# Patient Record
Sex: FEMALE | Race: BLACK OR AFRICAN AMERICAN | NOT HISPANIC OR LATINO | Employment: OTHER | RURAL
[De-identification: names, ages, dates, MRNs, and addresses within clinical notes are randomized per-mention and may not be internally consistent; named-entity substitution may affect disease eponyms.]

---

## 2020-06-09 ENCOUNTER — HISTORICAL (OUTPATIENT)
Dept: ADMINISTRATIVE | Facility: HOSPITAL | Age: 75
End: 2020-06-09

## 2020-06-09 LAB — GLUCOSE SERPL-MCNC: 112 MG/DL (ref 70–105)

## 2020-06-10 LAB
LAB AP CLINICAL INFORMATION: NORMAL
LAB AP COMMENTS: NORMAL
LAB AP DIAGNOSIS - HISTORICAL: NORMAL
LAB AP GROSS PATHOLOGY - HISTORICAL: NORMAL
LAB AP SPECIMEN SUBMITTED - HISTORICAL: NORMAL

## 2020-10-05 ENCOUNTER — HISTORICAL (OUTPATIENT)
Dept: ADMINISTRATIVE | Facility: HOSPITAL | Age: 75
End: 2020-10-05

## 2020-10-15 ENCOUNTER — HISTORICAL (OUTPATIENT)
Dept: ADMINISTRATIVE | Facility: HOSPITAL | Age: 75
End: 2020-10-15

## 2020-10-15 LAB — GLUCOSE SERPL-MCNC: 136 MG/DL (ref 70–105)

## 2020-10-16 LAB

## 2021-09-24 ENCOUNTER — OFFICE VISIT (OUTPATIENT)
Dept: DERMATOLOGY | Facility: CLINIC | Age: 76
End: 2021-09-24
Payer: MEDICARE

## 2021-09-24 VITALS — RESPIRATION RATE: 18 BRPM | BODY MASS INDEX: 47.11 KG/M2 | WEIGHT: 256 LBS | HEIGHT: 62 IN

## 2021-09-24 DIAGNOSIS — L23.9 ALLERGIC CONTACT DERMATITIS, UNSPECIFIED TRIGGER: Primary | ICD-10-CM

## 2021-09-24 PROCEDURE — 99213 OFFICE O/P EST LOW 20 MIN: CPT | Mod: ,,, | Performed by: DERMATOLOGY

## 2021-09-24 PROCEDURE — 99213 PR OFFICE/OUTPT VISIT, EST, LEVL III, 20-29 MIN: ICD-10-PCS | Mod: ,,, | Performed by: DERMATOLOGY

## 2021-09-24 RX ORDER — METHYLPREDNISOLONE 4 MG/1
TABLET ORAL
COMMUNITY
Start: 2021-07-29 | End: 2023-02-28 | Stop reason: ALTCHOICE

## 2021-09-24 RX ORDER — LOSARTAN POTASSIUM 100 MG/1
TABLET ORAL
COMMUNITY
Start: 2021-09-23 | End: 2023-02-16 | Stop reason: SDUPTHER

## 2021-09-24 RX ORDER — AMOXICILLIN 500 MG/1
CAPSULE ORAL
COMMUNITY
Start: 2021-05-05 | End: 2023-02-16

## 2021-09-24 RX ORDER — TRIAMCINOLONE ACETONIDE 0.25 MG/G
OINTMENT TOPICAL
Qty: 80 G | Refills: 3 | Status: SHIPPED | OUTPATIENT
Start: 2021-09-24 | End: 2023-02-28 | Stop reason: ALTCHOICE

## 2021-09-24 RX ORDER — DOXYCYCLINE 100 MG/1
1 CAPSULE ORAL
COMMUNITY
Start: 2020-09-29 | End: 2023-02-28 | Stop reason: ALTCHOICE

## 2021-09-24 RX ORDER — LEVALBUTEROL TARTRATE 45 UG/1
AEROSOL, METERED ORAL
COMMUNITY
Start: 2021-09-23 | End: 2023-02-28 | Stop reason: ALTCHOICE

## 2021-09-24 RX ORDER — AZELASTINE 1 MG/ML
1 SPRAY, METERED NASAL 2 TIMES DAILY
COMMUNITY
Start: 2021-09-17

## 2021-09-24 RX ORDER — ATENOLOL 50 MG/1
TABLET ORAL
COMMUNITY
Start: 2021-09-23 | End: 2023-02-16 | Stop reason: SDUPTHER

## 2021-09-24 RX ORDER — MORPHINE SULFATE 15 MG/1
15 TABLET, FILM COATED, EXTENDED RELEASE ORAL NIGHTLY
COMMUNITY
Start: 2021-09-08

## 2021-09-24 RX ORDER — GLIMEPIRIDE 4 MG/1
TABLET ORAL
COMMUNITY
Start: 2021-07-24

## 2021-09-24 RX ORDER — NITROGLYCERIN 80 MG/1
PATCH TRANSDERMAL
COMMUNITY
Start: 2021-07-06 | End: 2022-02-14 | Stop reason: SDUPTHER

## 2021-09-24 RX ORDER — DEXCHLORPHENIRAMINE MALEATE AND PHENYLEPHRINE HYDROCHLORIDE 2; 10 MG/1; MG/1
1 TABLET, COATED ORAL 2 TIMES DAILY
COMMUNITY
Start: 2021-09-22

## 2021-09-24 RX ORDER — METFORMIN HYDROCHLORIDE 500 MG/1
TABLET ORAL
COMMUNITY
Start: 2021-09-21

## 2021-09-24 RX ORDER — TRIAMCINOLONE ACETONIDE 1 MG/G
OINTMENT TOPICAL
COMMUNITY
Start: 2021-04-01 | End: 2023-02-28 | Stop reason: ALTCHOICE

## 2021-09-24 RX ORDER — OXYCODONE AND ACETAMINOPHEN 10; 325 MG/1; MG/1
TABLET ORAL
COMMUNITY
Start: 2021-09-08 | End: 2023-02-28 | Stop reason: ALTCHOICE

## 2022-02-14 ENCOUNTER — OFFICE VISIT (OUTPATIENT)
Dept: CARDIOLOGY | Facility: CLINIC | Age: 77
End: 2022-02-14
Payer: MEDICARE

## 2022-02-14 VITALS
BODY MASS INDEX: 42.88 KG/M2 | SYSTOLIC BLOOD PRESSURE: 138 MMHG | HEART RATE: 79 BPM | HEIGHT: 62 IN | RESPIRATION RATE: 16 BRPM | DIASTOLIC BLOOD PRESSURE: 70 MMHG | WEIGHT: 233 LBS

## 2022-02-14 DIAGNOSIS — I10 HYPERTENSION, UNSPECIFIED TYPE: Primary | ICD-10-CM

## 2022-02-14 DIAGNOSIS — I10 HYPERTENSION, UNSPECIFIED TYPE: ICD-10-CM

## 2022-02-14 DIAGNOSIS — I10 PRIMARY HYPERTENSION: ICD-10-CM

## 2022-02-14 DIAGNOSIS — R07.9 CHEST PAIN, UNSPECIFIED TYPE: ICD-10-CM

## 2022-02-14 PROCEDURE — 93005 ELECTROCARDIOGRAM TRACING: CPT | Mod: PBBFAC | Performed by: STUDENT IN AN ORGANIZED HEALTH CARE EDUCATION/TRAINING PROGRAM

## 2022-02-14 PROCEDURE — 93010 ELECTROCARDIOGRAM REPORT: CPT | Mod: S$PBB,,, | Performed by: STUDENT IN AN ORGANIZED HEALTH CARE EDUCATION/TRAINING PROGRAM

## 2022-02-14 PROCEDURE — 93010 EKG 12-LEAD: ICD-10-PCS | Mod: S$PBB,,, | Performed by: STUDENT IN AN ORGANIZED HEALTH CARE EDUCATION/TRAINING PROGRAM

## 2022-02-14 PROCEDURE — 99214 OFFICE O/P EST MOD 30 MIN: CPT | Mod: PBBFAC | Performed by: STUDENT IN AN ORGANIZED HEALTH CARE EDUCATION/TRAINING PROGRAM

## 2022-02-14 PROCEDURE — 99214 OFFICE O/P EST MOD 30 MIN: CPT | Mod: S$PBB,,, | Performed by: STUDENT IN AN ORGANIZED HEALTH CARE EDUCATION/TRAINING PROGRAM

## 2022-02-14 PROCEDURE — 99214 PR OFFICE/OUTPT VISIT, EST, LEVL IV, 30-39 MIN: ICD-10-PCS | Mod: S$PBB,,, | Performed by: STUDENT IN AN ORGANIZED HEALTH CARE EDUCATION/TRAINING PROGRAM

## 2022-02-14 RX ORDER — NITROGLYCERIN 80 MG/1
PATCH TRANSDERMAL
Qty: 30 PATCH | Refills: 3 | Status: SHIPPED | OUTPATIENT
Start: 2022-02-14 | End: 2023-02-28 | Stop reason: ALTCHOICE

## 2022-02-14 NOTE — PROGRESS NOTES
"  PCP: Primary Doctor No    Referring Provider:     Subjective:   Quiana Bland is a 76 y.o. female with hx of HTN, DM, HLD who presents to establish cardiac care.     Patient of Dr. Reyes    Patient reports chronic aches and pain. Has chronic chest pain. Last C in 2019 with mild non-obstructive CAD. As per the patient she has nitro patches that help her and is adamant that she would like to continue them.     Fhx: non-contributary  Shx: Denies smoking, etoh or drug use    EKG 2/14/22 - NSR, non-sp T wave changes  ECHO 2019 - normal LV and RV size and fxn. Mild LAE enlargement. No significant valvular abnl.  LHC 2019 - mild non-obstructive CAD      No results found for: NA, K, CL, CO2, BUN, CREATININE, CALCIUM, ANIONGAP, ESTGFRAFRICA, EGFRNONAA    No results found for: CHOL  No results found for: HDL  No results found for: LDLCALC  No results found for: TRIG  No results found for: CHOLHDL    No results found for: WBC, HGB, HCT, MCV, PLT        Review of Systems   Respiratory: Negative for cough and shortness of breath.    Cardiovascular: Negative for chest pain, palpitations, orthopnea, claudication, leg swelling and PND.         Objective:   /70   Pulse 79   Resp 16   Ht 5' 2" (1.575 m)   Wt 105.7 kg (233 lb)   BMI 42.62 kg/m²     Physical Exam  Constitutional:       Appearance: She is obese.   Cardiovascular:      Rate and Rhythm: Normal rate and regular rhythm.      Pulses: Normal pulses.      Heart sounds: Normal heart sounds.   Pulmonary:      Effort: Pulmonary effort is normal.      Breath sounds: Normal breath sounds.   Neurological:      Mental Status: She is alert.           Assessment:     1. Hypertension, unspecified type  EKG 12-lead   2. Primary hypertension     3. Chest pain, unspecified type           Plan:   HTN (hypertension)  Chronic controlled  Continue Home BP meds    Chest pain  Mild non-obstructive CAD in 2019  Concern for microvascular dysfunction  CP relieved with nitro " patches

## 2022-02-14 NOTE — ASSESSMENT & PLAN NOTE
Mild non-obstructive CAD in 2019  Concern for microvascular dysfunction  CP relieved with nitro patches

## 2022-02-18 RX ORDER — DAPAGLIFLOZIN 10 MG/1
10 TABLET, FILM COATED ORAL DAILY
COMMUNITY
End: 2022-02-18 | Stop reason: SDUPTHER

## 2022-02-21 RX ORDER — DAPAGLIFLOZIN 10 MG/1
10 TABLET, FILM COATED ORAL DAILY
Qty: 90 TABLET | Refills: 1 | Status: SHIPPED | OUTPATIENT
Start: 2022-02-21 | End: 2023-02-16 | Stop reason: SDUPTHER

## 2022-11-15 DIAGNOSIS — G47.33 OSA (OBSTRUCTIVE SLEEP APNEA): Primary | ICD-10-CM

## 2022-11-20 ENCOUNTER — PROCEDURE VISIT (OUTPATIENT)
Dept: SLEEP MEDICINE | Facility: HOSPITAL | Age: 77
End: 2022-11-20
Payer: MEDICARE

## 2022-11-20 DIAGNOSIS — G47.33 OSA (OBSTRUCTIVE SLEEP APNEA): ICD-10-CM

## 2022-11-20 PROCEDURE — 95811 POLYSOM 6/>YRS CPAP 4/> PARM: CPT | Mod: PO

## 2023-02-16 ENCOUNTER — OFFICE VISIT (OUTPATIENT)
Dept: CARDIOLOGY | Facility: CLINIC | Age: 78
End: 2023-02-16
Payer: MEDICARE

## 2023-02-16 VITALS
WEIGHT: 225 LBS | RESPIRATION RATE: 16 BRPM | DIASTOLIC BLOOD PRESSURE: 80 MMHG | SYSTOLIC BLOOD PRESSURE: 150 MMHG | HEIGHT: 62 IN | HEART RATE: 86 BPM | BODY MASS INDEX: 41.41 KG/M2

## 2023-02-16 DIAGNOSIS — I10 HYPERTENSION, UNSPECIFIED TYPE: Primary | ICD-10-CM

## 2023-02-16 DIAGNOSIS — I50.33 ACUTE ON CHRONIC HEART FAILURE WITH PRESERVED EJECTION FRACTION: ICD-10-CM

## 2023-02-16 DIAGNOSIS — R06.02 SHORTNESS OF BREATH: Primary | ICD-10-CM

## 2023-02-16 DIAGNOSIS — I10 HYPERTENSION, UNSPECIFIED TYPE: ICD-10-CM

## 2023-02-16 PROCEDURE — 99214 OFFICE O/P EST MOD 30 MIN: CPT | Mod: S$PBB,,, | Performed by: STUDENT IN AN ORGANIZED HEALTH CARE EDUCATION/TRAINING PROGRAM

## 2023-02-16 PROCEDURE — 93005 ELECTROCARDIOGRAM TRACING: CPT | Mod: PBBFAC | Performed by: STUDENT IN AN ORGANIZED HEALTH CARE EDUCATION/TRAINING PROGRAM

## 2023-02-16 PROCEDURE — 3077F PR MOST RECENT SYSTOLIC BLOOD PRESSURE >= 140 MM HG: ICD-10-PCS | Mod: CPTII,,, | Performed by: STUDENT IN AN ORGANIZED HEALTH CARE EDUCATION/TRAINING PROGRAM

## 2023-02-16 PROCEDURE — 1159F PR MEDICATION LIST DOCUMENTED IN MEDICAL RECORD: ICD-10-PCS | Mod: CPTII,,, | Performed by: STUDENT IN AN ORGANIZED HEALTH CARE EDUCATION/TRAINING PROGRAM

## 2023-02-16 PROCEDURE — 3079F DIAST BP 80-89 MM HG: CPT | Mod: CPTII,,, | Performed by: STUDENT IN AN ORGANIZED HEALTH CARE EDUCATION/TRAINING PROGRAM

## 2023-02-16 PROCEDURE — 3079F PR MOST RECENT DIASTOLIC BLOOD PRESSURE 80-89 MM HG: ICD-10-PCS | Mod: CPTII,,, | Performed by: STUDENT IN AN ORGANIZED HEALTH CARE EDUCATION/TRAINING PROGRAM

## 2023-02-16 PROCEDURE — 3077F SYST BP >= 140 MM HG: CPT | Mod: CPTII,,, | Performed by: STUDENT IN AN ORGANIZED HEALTH CARE EDUCATION/TRAINING PROGRAM

## 2023-02-16 PROCEDURE — 99215 OFFICE O/P EST HI 40 MIN: CPT | Mod: PBBFAC | Performed by: STUDENT IN AN ORGANIZED HEALTH CARE EDUCATION/TRAINING PROGRAM

## 2023-02-16 PROCEDURE — 1159F MED LIST DOCD IN RCRD: CPT | Mod: CPTII,,, | Performed by: STUDENT IN AN ORGANIZED HEALTH CARE EDUCATION/TRAINING PROGRAM

## 2023-02-16 PROCEDURE — 93010 EKG 12-LEAD: ICD-10-PCS | Mod: S$PBB,,, | Performed by: STUDENT IN AN ORGANIZED HEALTH CARE EDUCATION/TRAINING PROGRAM

## 2023-02-16 PROCEDURE — 99214 PR OFFICE/OUTPT VISIT, EST, LEVL IV, 30-39 MIN: ICD-10-PCS | Mod: S$PBB,,, | Performed by: STUDENT IN AN ORGANIZED HEALTH CARE EDUCATION/TRAINING PROGRAM

## 2023-02-16 PROCEDURE — 93010 ELECTROCARDIOGRAM REPORT: CPT | Mod: S$PBB,,, | Performed by: STUDENT IN AN ORGANIZED HEALTH CARE EDUCATION/TRAINING PROGRAM

## 2023-02-16 RX ORDER — BUDESONIDE 0.5 MG/2ML
INHALANT ORAL
COMMUNITY
Start: 2022-05-09

## 2023-02-16 RX ORDER — LOSARTAN POTASSIUM 100 MG/1
TABLET ORAL
COMMUNITY
Start: 2022-08-03

## 2023-02-16 RX ORDER — DAPAGLIFLOZIN 10 MG/1
10 TABLET, FILM COATED ORAL DAILY
Qty: 90 TABLET | Refills: 3 | Status: SHIPPED | OUTPATIENT
Start: 2023-02-16

## 2023-02-16 RX ORDER — CYANOCOBALAMIN 1000 UG/ML
INJECTION, SOLUTION INTRAMUSCULAR; SUBCUTANEOUS
COMMUNITY
Start: 2022-04-13

## 2023-02-16 RX ORDER — SPIRONOLACTONE AND HYDROCHLOROTHIAZIDE 25; 25 MG/1; MG/1
1 TABLET ORAL 2 TIMES DAILY
COMMUNITY
Start: 2022-11-01 | End: 2023-02-28 | Stop reason: ALTCHOICE

## 2023-02-16 RX ORDER — ISOSORBIDE MONONITRATE 30 MG/1
30 TABLET, EXTENDED RELEASE ORAL DAILY
Qty: 90 TABLET | Refills: 3 | Status: SHIPPED | OUTPATIENT
Start: 2023-02-16 | End: 2024-02-16

## 2023-02-16 RX ORDER — IPRATROPIUM BROMIDE AND ALBUTEROL SULFATE 2.5; .5 MG/3ML; MG/3ML
SOLUTION RESPIRATORY (INHALATION)
COMMUNITY
Start: 2022-05-09

## 2023-02-16 RX ORDER — LINACLOTIDE 145 UG/1
CAPSULE, GELATIN COATED ORAL
COMMUNITY
Start: 2023-01-24

## 2023-02-16 RX ORDER — REPAGLINIDE 1 MG/1
TABLET ORAL
COMMUNITY
Start: 2023-02-11 | End: 2023-02-16 | Stop reason: SDUPTHER

## 2023-02-16 RX ORDER — REPAGLINIDE 1 MG/1
TABLET ORAL
COMMUNITY
Start: 2022-04-11

## 2023-02-16 RX ORDER — FLUTICASONE PROPIONATE 50 MCG
SPRAY, SUSPENSION (ML) NASAL
COMMUNITY
Start: 2022-01-11

## 2023-02-16 RX ORDER — ATENOLOL 50 MG/1
TABLET ORAL
COMMUNITY
Start: 2022-07-08 | End: 2023-02-16

## 2023-02-16 RX ORDER — ALBUTEROL SULFATE 2 MG/5ML
SYRUP ORAL
COMMUNITY
Start: 2023-02-13

## 2023-02-16 RX ORDER — OLOPATADINE HYDROCHLORIDE 1 MG/ML
SOLUTION/ DROPS OPHTHALMIC
COMMUNITY
Start: 2022-08-10

## 2023-02-16 RX ORDER — METOPROLOL SUCCINATE 100 MG/1
100 TABLET, EXTENDED RELEASE ORAL DAILY
Qty: 90 TABLET | Refills: 3 | Status: SHIPPED | OUTPATIENT
Start: 2023-02-16 | End: 2024-02-21

## 2023-02-16 NOTE — PATIENT INSTRUCTIONS
Stop atenolol  Start metoprolol xl 100mg once a day  Start IMDUR 30mg once a day  Vein clinic referral  ECHO

## 2023-02-16 NOTE — ASSESSMENT & PLAN NOTE
Symptoms congestive HF  Last EF was 55%  Will repeat ECHO  Check NTproBNP  Already on farxiga; if IVC is dilated on ECHo will start lasix   Will refer to vein clinic for possible venous insufficiency

## 2023-02-16 NOTE — PROGRESS NOTES
"  PCP: Primary Doctor No    Referring Provider:     Subjective:   Quiana Bland is a 77 y.o. female with hx of HTN, DM, HLD who presents to establish cardiac care.     2/16/23 - Poor historian. Report chronic leg swelling with knee pain. Has orthopnea and PND. Limited mobility. Wearing stockings today    2/2022 - Patient reports chronic aches and pain. Has chronic chest pain. Last [facility] [place] in 2019 with mild non-obstructive CAD. As per the patient she has nitro patches that help her and is adamant that she would like to continue them.     Fhx: non-contributary  Shx: Denies smoking, etoh or drug use    EKG 2/14/22 - NSR, non-sp T wave changes  ECHO 2019 - normal LV and RV size and fxn. Mild LAE enlargement. No significant valvular abnl.  [facility] [place] 2019 - mild non-obstructive CAD      No results found for: NA, K, CL, CO2, BUN, CREATININE, CALCIUM, ANIONGAP, ESTGFRAFRICA, EGFRNONAA    No results found for: CHOL  No results found for: HDL  No results found for: LDLCALC  No results found for: TRIG  No results found for: CHOLHDL    No results found for: WBC, HGB, HCT, MCV, PLT        Review of Systems   Respiratory:  Negative for cough and shortness of breath.    Cardiovascular:  Negative for chest pain, palpitations, orthopnea, claudication, leg swelling and PND.       Objective:   BP (!) 150/80   Pulse 86   Resp 16   Ht 5' 2" (1.575 m)   Wt 102.1 kg (225 lb)   BMI 41.15 kg/m²     Physical Exam  Constitutional:       Appearance: She is obese.   Cardiovascular:      Rate and Rhythm: Normal rate and regular rhythm.      Pulses: Normal pulses.      Heart sounds: Normal heart sounds.   Pulmonary:      Effort: Pulmonary effort is normal.      Breath sounds: Normal breath sounds.   Neurological:      Mental Status: She is alert.         Assessment:     1. Shortness of breath  Ambulatory referral/consult to [facility]    Echo    CBC Auto Differential    Basic Metabolic Panel    NT-Pro Natriuretic Peptide      2. Hypertension, " unspecified type  EKG 12-lead      3. Acute on chronic heart failure with preserved ejection fraction              Plan:   Acute on chronic heart failure with preserved ejection fraction  Symptoms congestive HF  Last EF was 55%  Will repeat ECHO  Check NTproBNP  Already on farxiga; if IVC is dilated on ECHo will start lasix   Will refer to vein clinic for possible venous insufficiency      HTN (hypertension)  Uncontrolled  Stop atenolol  Switch to metop xl 100mg qd and IMDUR 30mg qd

## 2023-02-28 ENCOUNTER — OFFICE VISIT (OUTPATIENT)
Dept: VASCULAR SURGERY | Facility: CLINIC | Age: 78
End: 2023-02-28
Payer: MEDICARE

## 2023-02-28 VITALS
WEIGHT: 228.63 LBS | BODY MASS INDEX: 42.07 KG/M2 | RESPIRATION RATE: 18 BRPM | HEART RATE: 76 BPM | SYSTOLIC BLOOD PRESSURE: 118 MMHG | HEIGHT: 62 IN | DIASTOLIC BLOOD PRESSURE: 70 MMHG

## 2023-02-28 DIAGNOSIS — L81.9 HYPERPIGMENTATION OF SKIN: ICD-10-CM

## 2023-02-28 DIAGNOSIS — R60.0 EDEMA, LOWER EXTREMITY: ICD-10-CM

## 2023-02-28 DIAGNOSIS — M79.605 LEG PAIN, BILATERAL: Primary | ICD-10-CM

## 2023-02-28 DIAGNOSIS — M79.604 LEG PAIN, BILATERAL: Primary | ICD-10-CM

## 2023-02-28 DIAGNOSIS — R06.02 SHORTNESS OF BREATH: ICD-10-CM

## 2023-02-28 PROCEDURE — 3074F SYST BP LT 130 MM HG: CPT | Mod: CPTII,,, | Performed by: FAMILY MEDICINE

## 2023-02-28 PROCEDURE — 3078F PR MOST RECENT DIASTOLIC BLOOD PRESSURE < 80 MM HG: ICD-10-PCS | Mod: CPTII,,, | Performed by: FAMILY MEDICINE

## 2023-02-28 PROCEDURE — 99203 OFFICE O/P NEW LOW 30 MIN: CPT | Mod: S$PBB,,, | Performed by: FAMILY MEDICINE

## 2023-02-28 PROCEDURE — 3078F DIAST BP <80 MM HG: CPT | Mod: CPTII,,, | Performed by: FAMILY MEDICINE

## 2023-02-28 PROCEDURE — 1159F MED LIST DOCD IN RCRD: CPT | Mod: CPTII,,, | Performed by: FAMILY MEDICINE

## 2023-02-28 PROCEDURE — 3074F PR MOST RECENT SYSTOLIC BLOOD PRESSURE < 130 MM HG: ICD-10-PCS | Mod: CPTII,,, | Performed by: FAMILY MEDICINE

## 2023-02-28 PROCEDURE — 99203 PR OFFICE/OUTPT VISIT, NEW, LEVL III, 30-44 MIN: ICD-10-PCS | Mod: S$PBB,,, | Performed by: FAMILY MEDICINE

## 2023-02-28 PROCEDURE — 1159F PR MEDICATION LIST DOCUMENTED IN MEDICAL RECORD: ICD-10-PCS | Mod: CPTII,,, | Performed by: FAMILY MEDICINE

## 2023-02-28 PROCEDURE — 1160F PR REVIEW ALL MEDS BY PRESCRIBER/CLIN PHARMACIST DOCUMENTED: ICD-10-PCS | Mod: CPTII,,, | Performed by: FAMILY MEDICINE

## 2023-02-28 PROCEDURE — 1160F RVW MEDS BY RX/DR IN RCRD: CPT | Mod: CPTII,,, | Performed by: FAMILY MEDICINE

## 2023-02-28 PROCEDURE — 99213 OFFICE O/P EST LOW 20 MIN: CPT | Mod: PBBFAC | Performed by: FAMILY MEDICINE

## 2023-02-28 RX ORDER — LEVALBUTEROL TARTRATE 45 UG/1
AEROSOL, METERED ORAL
COMMUNITY
Start: 2022-07-07

## 2023-02-28 RX ORDER — SPIRONOLACTONE AND HYDROCHLOROTHIAZIDE 25; 25 MG/1; MG/1
TABLET ORAL
COMMUNITY
Start: 2022-11-10

## 2023-02-28 NOTE — PROGRESS NOTES
VEIN CENTER CLINIC NOTE    Patient ID: Quiana Bland is a 77 y.o. female.    I. HISTORY     Chief Complaint:   Chief Complaint   Patient presents with    Leg Swelling     Exam room 2.  NP referral from Dr. Jones re: bilateral leg swelling.        HPI: Quiana Bland is a 77 y.o. female who is referred here today by Dr Ramírez, cardiology, for evaluation of bilateral lower extremity leg swelling and discomfort.  Symptoms are progressive/worsening and began a proximally 2 years ago.  Location is bilateral lower extremities below the knees. Symptoms are worse at the end of the day.  History of venous interventions includes none.  Unknown family history of venous disease.  Patient also sees Dr. Lozoya, pulmonology, and has a history of obstructive sleep apnea and uses a CPAP nightly.  She also sleeps in a chair secondary to orthopnea.  Also complains of right knee pain and swelling and limited ambulation throughout the day.  Recent negative BNP.  Echocardiogram pending.      Venous Disease Medical Necessity Documentation Initial Visit Date:  02/28/2023 Return Check Date:    Have you ever had a rupture or bleed from a varicose vein in your leg(s)?              [] Yes  [x] No   [] Yes   [] No   Have you ever been diagnosed with phlebitis, cellulitis, or inflammation in the area of the varicose veins of  your leg(s)?  [] Yes  [x] No    [] Yes   [] No   Do you have darkened or inflamed skin on your legs?   [] Yes   [x] No   [] Yes   [] No   Do you have leg swelling?     [x] Yes   [] No   [] Yes   [] No   Do you have leg pain?   [x] Yes   [] No   [] Yes   [] No   If yes, describe the type of pain?    [x]   Stabbing [x]  Radiating [x]  Aching   [x]  Tightness [x]  Throbbing               [x]  Burning [x]  Cramping              Do you have leg discomfort?   [x] Yes   [] No   [] Yes   [] No   If yes, describe the type of discomfort?    [x]  Heaviness [x]  Fullness   [x]  Restlessness [x] Tired/Fatigued [x] Itching               Have you ever worn compression hose?   [x] Yes   [] No   [] Yes   [] No   If yes, how long?    18 months       Do you elevate your legs while sitting?   [x] Yes   [] No   [] Yes   [] No   Does venous disease (varicose veins, ulcers, skin changes, leg pain/swelling) interfere with your daily life?  If yes, check activities you are limited or unable to do.    [x]  Shower  [x]   Walk  []  Exercise  [x] Play with children/grandchildren  [x]  Shop [] Work [x] Stand for any period of time [x] Sleep                               [x] Sitting for an extended period of time.           [x] Yes   [] No   [] Yes   [] No   Do you exercise/have you tried to exercise (i.e.  Walk our participate in a regular exercise routine)?  [] Yes  [x] No   [] Yes   [] No   BMI 41.8             Past Medical History:   Diagnosis Date    CHF (congestive heart failure)     DM (diabetes mellitus)     High cholesterol     HTN (hypertension)     Seizures         Past Surgical History:   Procedure Laterality Date    CARDIAC CATHETERIZATION       SECTION      x 1    gall stones removed      HYSTERECTOMY      TUBAL LIGATION      VAGINAL DELIVERY      x 5       Social History     Tobacco Use   Smoking Status Never   Smokeless Tobacco Never         Current Outpatient Medications:     albuterol 2 mg/5 mL syrup, take 1/2 (one-half) TEASPOONFUL (2.5mls) BY MOUTH THREE TIMES DAILY, Disp: , Rfl:     albuterol-ipratropium (DUO-NEB) 2.5 mg-0.5 mg/3 mL nebulizer solution, Ipratropium-Albuterol 0.5-2.5 (3) MG/3ML Inhalation Solution QTY: 120 mL Days: 30 Refills: 11  Written: 22 Patient Instructions: USE 1 VIAL IN NEBULIZER 4 TIMES DAILY, Disp: , Rfl:     azelastine (ASTELIN) 137 mcg (0.1 %) nasal spray, 1 spray 2 (two) times daily., Disp: , Rfl:     budesonide (PULMICORT) 0.5 mg/2 mL nebulizer solution, Budesonide 0.5 MG/2ML Inhalation Suspension QTY: 120 mL Days: 30 Refills: 11  Written: 22 Patient Instructions: USE 1 VIAL  IN   NEBULIZER TWICE  DAILY - rinse mouth out after each use, Disp: , Rfl:     cyanocobalamin 1,000 mcg/mL injection, Cyanocobalamin 1000 MCG/ML Injection Solution QTY: 1 mL Days: 0 Refills: 0  Written: 04/13/22 Patient Instructions: ONCE MONTHLY, Disp: , Rfl:     dapagliflozin (FARXIGA) 10 mg tablet, Take 1 tablet (10 mg total) by mouth once daily., Disp: 90 tablet, Rfl: 3    fluticasone propionate (FLONASE) 50 mcg/actuation nasal spray, Fluticasone Propionate 50 MCG/ACT Nasal Suspension QTY: 1 gram Days: 30 Refills: 3  Written: 01/11/22 Patient Instructions: twice a day 1 spray each nostril, Disp: , Rfl:     glimepiride (AMARYL) 4 MG tablet, , Disp: , Rfl:     isosorbide mononitrate (IMDUR) 30 MG 24 hr tablet, Take 1 tablet (30 mg total) by mouth once daily., Disp: 90 tablet, Rfl: 3    levalbuterol (XOPENEX HFA) 45 mcg/actuation inhaler, Xopenex HFA 45 MCG/ACT Inhalation Aerosol QTY: 0  Days: 0 Refills: 0  Written: 07/07/22 Patient Instructions:, Disp: , Rfl:     LINZESS 145 mcg Cap capsule, TAKE ONE CAPSULE BY MOUTH ONCE DAILY FOR constipation, Disp: , Rfl:     losartan (COZAAR) 100 MG tablet, Losartan Potassium 100 MG Oral Tablet QTY: 90 tablet Days: 90 Refills: 3  Written: 08/03/22 Patient Instructions: once a day, Disp: , Rfl:     metFORMIN (GLUCOPHAGE) 500 MG tablet, , Disp: , Rfl:     metoprolol succinate (TOPROL-XL) 100 MG 24 hr tablet, Take 1 tablet (100 mg total) by mouth once daily., Disp: 90 tablet, Rfl: 3    morphine (MS CONTIN) 15 MG 12 hr tablet, Take 15 mg by mouth nightly., Disp: , Rfl:     olopatadine (PATANOL) 0.1 % ophthalmic solution, Pataday 0.1% Ophthalmic Solution QTY: 0  Days: 0 Refills: 0  Written: 08/10/22 Patient Instructions:, Disp: , Rfl:     repaglinide (PRANDIN) 1 MG tablet, Repaglinide 1 MG Oral Tablet QTY: 60 tablet Days: 30 Refills: 5  Written: 04/11/22 Patient Instructions: twice a day with food, Disp: , Rfl:     RYMED, DEXCHLORPHENIRAMINE-PE, 2-10 mg Tab, Take 1 tablet by mouth 2  (two) times daily., Disp: , Rfl:     spironolactone-hydrochlorothiazide 25-25mg (ALDACTAZIDE) 25-25 mg Tab, Spironolactone-HCTZ 25-25 MG Oral Tablet QTY: 0 tablet Days: 7 Refills: 0  Written: 11/10/22 Patient Instructions:, Disp: , Rfl:     Review of Systems   Constitutional:  Negative for activity change, chills, diaphoresis, fatigue and fever.   Respiratory:  Negative for cough and shortness of breath.    Cardiovascular:  Positive for leg swelling. Negative for chest pain and claudication.        Hyperpigmentation LE   Gastrointestinal:  Negative for nausea and vomiting.   Musculoskeletal:  Positive for leg pain. Negative for joint swelling.   Integumentary:  Negative for rash and wound.   Neurological:  Negative for weakness and numbness.        II. PHYSICAL EXAM     Physical Exam  Constitutional:       General: She is awake. She is not in acute distress.     Appearance: Normal appearance. She is obese. She is not ill-appearing or toxic-appearing.   HENT:      Head: Normocephalic and atraumatic.   Eyes:      Extraocular Movements: Extraocular movements intact.      Conjunctiva/sclera: Conjunctivae normal.      Pupils: Pupils are equal, round, and reactive to light.   Neck:      Vascular: No carotid bruit or JVD.   Cardiovascular:      Rate and Rhythm: Normal rate and regular rhythm.      Pulses:           Dorsalis pedis pulses are detected w/ Doppler on the right side and detected w/ Doppler on the left side.        Posterior tibial pulses are detected w/ Doppler on the right side and detected w/ Doppler on the left side.      Heart sounds: No murmur heard.  Pulmonary:      Effort: Pulmonary effort is normal. No respiratory distress.      Breath sounds: No stridor. No wheezing, rhonchi or rales.   Musculoskeletal:         General: No swelling, tenderness or deformity.      Right lower le+ Edema present.      Left lower le+ Edema present.      Comments: No evidence of cellulitis, weeping or open ulceration  bilaterally.   Feet:      Comments: Triphasic hand-held dopplerable pulses of the bilateral dorsalis pedis and posterior tibial arteries.  Skin:     General: Skin is warm.      Capillary Refill: Capillary refill takes less than 2 seconds.      Coloration: Skin is not ashen.      Findings: No bruising, erythema, lesion, rash or wound.   Neurological:      Mental Status: She is alert and oriented to person, place, and time.      Motor: No weakness.   Psychiatric:         Speech: Speech normal.         Behavior: Behavior normal. Behavior is cooperative.       Reticular/Spider veins noted:  RLE: none  LLE: none    Varicose veins noted:  RLE: none  LLE:  none    CEAP Classification                       Venous Clinical Severity Score     III. ASSESSMENT & PLAN (MEDICAL DECISION MAKING)     1. Leg pain, bilateral    2. Edema, lower extremity    3. Hyperpigmentation of skin        Assessment/Diagnosis and Plan:  Patient has complaints, symptoms and physical exam findings of chronic venous disease.  Therefore, I will order a bilateral complete venous reflux study and see the patient back with results.    Orders Placed This Encounter    US Venous Reflux Study Bilateral          Brandan Babin DO

## 2023-03-08 ENCOUNTER — OFFICE VISIT (OUTPATIENT)
Dept: VASCULAR SURGERY | Facility: CLINIC | Age: 78
End: 2023-03-08
Payer: MEDICARE

## 2023-03-08 ENCOUNTER — HOSPITAL ENCOUNTER (OUTPATIENT)
Dept: RADIOLOGY | Facility: HOSPITAL | Age: 78
Discharge: HOME | End: 2023-03-08
Attending: FAMILY MEDICINE
Payer: MEDICARE

## 2023-03-08 VITALS
RESPIRATION RATE: 15 BRPM | SYSTOLIC BLOOD PRESSURE: 138 MMHG | BODY MASS INDEX: 41.96 KG/M2 | WEIGHT: 228 LBS | HEIGHT: 62 IN | HEART RATE: 75 BPM | DIASTOLIC BLOOD PRESSURE: 77 MMHG

## 2023-03-08 DIAGNOSIS — M79.604 LEG PAIN, BILATERAL: ICD-10-CM

## 2023-03-08 DIAGNOSIS — R60.0 EDEMA, LOWER EXTREMITY: ICD-10-CM

## 2023-03-08 DIAGNOSIS — I87.2 VENOUS INSUFFICIENCY: ICD-10-CM

## 2023-03-08 DIAGNOSIS — L81.9 HYPERPIGMENTATION OF SKIN: Primary | ICD-10-CM

## 2023-03-08 DIAGNOSIS — M79.605 LEG PAIN, BILATERAL: ICD-10-CM

## 2023-03-08 DIAGNOSIS — L81.9 HYPERPIGMENTATION OF SKIN: ICD-10-CM

## 2023-03-08 PROCEDURE — 3078F PR MOST RECENT DIASTOLIC BLOOD PRESSURE < 80 MM HG: ICD-10-PCS | Mod: CPTII,,, | Performed by: FAMILY MEDICINE

## 2023-03-08 PROCEDURE — 1159F MED LIST DOCD IN RCRD: CPT | Mod: CPTII,,, | Performed by: FAMILY MEDICINE

## 2023-03-08 PROCEDURE — 3075F PR MOST RECENT SYSTOLIC BLOOD PRESS GE 130-139MM HG: ICD-10-PCS | Mod: CPTII,,, | Performed by: FAMILY MEDICINE

## 2023-03-08 PROCEDURE — 99214 PR OFFICE/OUTPT VISIT, EST, LEVL IV, 30-39 MIN: ICD-10-PCS | Mod: S$PBB,,, | Performed by: FAMILY MEDICINE

## 2023-03-08 PROCEDURE — 1160F RVW MEDS BY RX/DR IN RCRD: CPT | Mod: CPTII,,, | Performed by: FAMILY MEDICINE

## 2023-03-08 PROCEDURE — 1159F PR MEDICATION LIST DOCUMENTED IN MEDICAL RECORD: ICD-10-PCS | Mod: CPTII,,, | Performed by: FAMILY MEDICINE

## 2023-03-08 PROCEDURE — 99214 OFFICE O/P EST MOD 30 MIN: CPT | Mod: S$PBB,,, | Performed by: FAMILY MEDICINE

## 2023-03-08 PROCEDURE — 93970 US VENOUS REFLUX STUDY BILATERAL: ICD-10-PCS | Mod: 26,,, | Performed by: FAMILY MEDICINE

## 2023-03-08 PROCEDURE — 99215 OFFICE O/P EST HI 40 MIN: CPT | Mod: PBBFAC,25 | Performed by: FAMILY MEDICINE

## 2023-03-08 PROCEDURE — 3075F SYST BP GE 130 - 139MM HG: CPT | Mod: CPTII,,, | Performed by: FAMILY MEDICINE

## 2023-03-08 PROCEDURE — 93970 EXTREMITY STUDY: CPT | Mod: 26,,, | Performed by: FAMILY MEDICINE

## 2023-03-08 PROCEDURE — 93970 EXTREMITY STUDY: CPT | Mod: TC

## 2023-03-08 PROCEDURE — 1160F PR REVIEW ALL MEDS BY PRESCRIBER/CLIN PHARMACIST DOCUMENTED: ICD-10-PCS | Mod: CPTII,,, | Performed by: FAMILY MEDICINE

## 2023-03-08 PROCEDURE — 3078F DIAST BP <80 MM HG: CPT | Mod: CPTII,,, | Performed by: FAMILY MEDICINE

## 2023-03-08 NOTE — PROGRESS NOTES
[facility] CLINIC NOTE    Patient ID: Quiana Bland is a 77 y.o. female.    I. HISTORY     Chief Complaint:   Chief Complaint   Patient presents with    Follow-up     Room 4.  PADDY COMP        HPI: Quiana Bland is a 77 y.o. female who the patient presents today for follow-up and bilateral complete venous reflux study.  The study performed today, 03/08/2023, shows no evidence of DVT bilaterally.  The study also shows dilation and axial reflux of the bilateral great saphenous veins.    Patient initially seen on 02/28/2023 by referral from Dr Ramírez, cardiology, for evaluation of bilateral lower extremity leg swelling and discomfort.  Symptoms are progressive/worsening and began a proximally 2 years ago.  Location is bilateral lower extremities below the knees. Symptoms are worse at the end of the day.  History of venous interventions includes none.  Unknown family history of venous disease.  Patient also sees Dr. Lozoya, pulmonology, and has a history of obstructive sleep apnea and uses a CPAP nightly.  She also sleeps in a chair secondary to orthopnea.  Also complains of right knee pain and swelling and limited ambulation throughout the day.  Recent negative BNP.  Echocardiogram pending.    Venous Disease Medical Necessity Documentation Initial Visit Date:  02/28/2023 Return Check Date:    Have you ever had a rupture or bleed from a varicose vein in your leg(s)?              [] Yes  [x] No   [] Yes   [] No   Have you ever been diagnosed with phlebitis, cellulitis, or inflammation in the area of the varicose veins of  your leg(s)?  [] Yes  [x] No    [] Yes   [] No   Do you have darkened or inflamed skin on your legs?   [] Yes   [x] No   [] Yes   [] No   Do you have leg swelling?     [x] Yes   [] No   [] Yes   [] No   Do you have leg pain?   [x] Yes   [] No   [] Yes   [] No   If yes, describe the type of pain?    [x]   Stabbing [x]  Radiating [x]  Aching   [x]  Tightness [x]  Throbbing               [x]   Burning [x]  Cramping              Do you have leg discomfort?   [x] Yes   [] No   [] Yes   [] No   If yes, describe the type of discomfort?    [x]  Heaviness [x]  Fullness   [x]  Restlessness [x] Tired/Fatigued [x] Itching              Have you ever worn compression hose?   [x] Yes   [] No   [] Yes   [] No   If yes, how long?    18 months       Do you elevate your legs while sitting?   [x] Yes   [] No   [] Yes   [] No   Does venous disease (varicose veins, ulcers, skin changes, leg pain/swelling) interfere with your daily life?  If yes, check activities you are limited or unable to do.    [x]  Shower  [x]   Walk  []  Exercise  [x] Play with children/grandchildren  [x]  Shop [] Work [x] Stand for any period of time [x] Sleep                               [x] Sitting for an extended period of time.           [x] Yes   [] No   [] Yes   [] No   Do you exercise/have you tried to exercise (i.e.  Walk our participate in a regular exercise routine)?  [] Yes  [x] No   [] Yes   [] No   BMI 41.8             Past Medical History:   Diagnosis Date    CHF (congestive heart failure)     DM (diabetes mellitus)     High cholesterol     HTN (hypertension)     Seizures         Past Surgical History:   Procedure Laterality Date    CARDIAC CATHETERIZATION       SECTION      x 1    gall stones removed      HYSTERECTOMY      TUBAL LIGATION      VAGINAL DELIVERY      x 5       Social History     Tobacco Use   Smoking Status Never   Smokeless Tobacco Never         Current Outpatient Medications:     albuterol 2 mg/5 mL syrup, take 1/2 (one-half) TEASPOONFUL (2.5mls) BY MOUTH THREE TIMES DAILY, Disp: , Rfl:     albuterol-ipratropium (DUO-NEB) 2.5 mg-0.5 mg/3 mL nebulizer solution, Ipratropium-Albuterol 0.5-2.5 (3) MG/3ML Inhalation Solution QTY: 120 mL Days: 30 Refills: 11  Written: 22 Patient Instructions: USE 1 VIAL IN NEBULIZER 4 TIMES DAILY, Disp: , Rfl:     azelastine (ASTELIN) 137 mcg (0.1 %) nasal spray, 1 spray 2 (two)  times daily., Disp: , Rfl:     budesonide (PULMICORT) 0.5 mg/2 mL nebulizer solution, Budesonide 0.5 MG/2ML Inhalation Suspension QTY: 120 mL Days: 30 Refills: 11  Written: 05/09/22 Patient Instructions: USE 1 VIAL  IN  NEBULIZER TWICE  DAILY - rinse mouth out after each use, Disp: , Rfl:     cyanocobalamin 1,000 mcg/mL injection, Cyanocobalamin 1000 MCG/ML Injection Solution QTY: 1 mL Days: 0 Refills: 0  Written: 04/13/22 Patient Instructions: ONCE MONTHLY, Disp: , Rfl:     dapagliflozin (FARXIGA) 10 mg tablet, Take 1 tablet (10 mg total) by mouth once daily., Disp: 90 tablet, Rfl: 3    fluticasone propionate (FLONASE) 50 mcg/actuation nasal spray, Fluticasone Propionate 50 MCG/ACT Nasal Suspension QTY: 1 gram Days: 30 Refills: 3  Written: 01/11/22 Patient Instructions: twice a day 1 spray each nostril, Disp: , Rfl:     glimepiride (AMARYL) 4 MG tablet, , Disp: , Rfl:     isosorbide mononitrate (IMDUR) 30 MG 24 hr tablet, Take 1 tablet (30 mg total) by mouth once daily., Disp: 90 tablet, Rfl: 3    levalbuterol (XOPENEX HFA) 45 mcg/actuation inhaler, Xopenex HFA 45 MCG/ACT Inhalation Aerosol QTY: 0  Days: 0 Refills: 0  Written: 07/07/22 Patient Instructions:, Disp: , Rfl:     LINZESS 145 mcg Cap capsule, TAKE ONE CAPSULE BY MOUTH ONCE DAILY FOR constipation, Disp: , Rfl:     losartan (COZAAR) 100 MG tablet, Losartan Potassium 100 MG Oral Tablet QTY: 90 tablet Days: 90 Refills: 3  Written: 08/03/22 Patient Instructions: once a day, Disp: , Rfl:     metFORMIN (GLUCOPHAGE) 500 MG tablet, , Disp: , Rfl:     metoprolol succinate (TOPROL-XL) 100 MG 24 hr tablet, Take 1 tablet (100 mg total) by mouth once daily., Disp: 90 tablet, Rfl: 3    morphine (MS CONTIN) 15 MG 12 hr tablet, Take 15 mg by mouth nightly., Disp: , Rfl:     olopatadine (PATANOL) 0.1 % ophthalmic solution, Pataday 0.1% Ophthalmic Solution QTY: 0  Days: 0 Refills: 0  Written: 08/10/22 Patient Instructions:, Disp: , Rfl:     repaglinide (PRANDIN) 1 MG  tablet, Repaglinide 1 MG Oral Tablet QTY: 60 tablet Days: 30 Refills: 5  Written: 04/11/22 Patient Instructions: twice a day with food, Disp: , Rfl:     RYMED, DEXCHLORPHENIRAMINE-PE, 2-10 mg Tab, Take 1 tablet by mouth 2 (two) times daily., Disp: , Rfl:     spironolactone-hydrochlorothiazide 25-25mg (ALDACTAZIDE) 25-25 mg Tab, Spironolactone-HCTZ 25-25 MG Oral Tablet QTY: 0 tablet Days: 7 Refills: 0  Written: 11/10/22 Patient Instructions:, Disp: , Rfl:     Review of Systems   Constitutional:  Negative for activity change, chills, diaphoresis, fatigue and fever.   Respiratory:  Negative for cough and shortness of breath.    Cardiovascular:  Positive for leg swelling. Negative for chest pain and claudication.        Hyperpigmentation LE   Gastrointestinal:  Negative for nausea and vomiting.   Musculoskeletal:  Positive for leg pain. Negative for joint swelling.   Integumentary:  Negative for rash and wound.   Neurological:  Negative for weakness and numbness.        II. PHYSICAL EXAM     Physical Exam  Constitutional:       General: She is awake. She is not in acute distress.     Appearance: Normal appearance. She is obese. She is not ill-appearing or toxic-appearing.   HENT:      Head: Normocephalic and atraumatic.   Eyes:      Extraocular Movements: Extraocular movements intact.      Conjunctiva/sclera: Conjunctivae normal.      Pupils: Pupils are equal, round, and reactive to light.   Neck:      Vascular: No carotid bruit or JVD.   Cardiovascular:      Rate and Rhythm: Normal rate and regular rhythm.      Pulses:           Dorsalis pedis pulses are detected w/ Doppler on the right side and detected w/ Doppler on the left side.        Posterior tibial pulses are detected w/ Doppler on the right side and detected w/ Doppler on the left side.      Heart sounds: No murmur heard.  Pulmonary:      Effort: Pulmonary effort is normal. No respiratory distress.      Breath sounds: No stridor. No wheezing, rhonchi or rales.    Musculoskeletal:         General: No swelling, tenderness or deformity.      Right lower le+ Edema present.      Left lower le+ Edema present.      Comments: No evidence of cellulitis, weeping or open ulceration bilaterally.   Feet:      Comments: Triphasic hand-held dopplerable pulses of the bilateral dorsalis pedis and posterior tibial arteries.  Skin:     General: Skin is warm.      Capillary Refill: Capillary refill takes less than 2 seconds.      Coloration: Skin is not ashen.      Findings: No bruising, erythema, lesion, rash or wound.   Neurological:      Mental Status: She is alert and oriented to person, place, and time.      Motor: No weakness.   Psychiatric:         Speech: Speech normal.         Behavior: Behavior normal. Behavior is cooperative.       Reticular/Spider veins noted:  RLE: none  LLE: none    Varicose veins noted:  RLE: none  LLE:  none    CEAP Classification  Clinical Signs: Class 4 - Skin changes ascribed to venous disease  Etiologic Classification: Primary  Anatomic distribution: Superficial  Pathophysiologic dysfunction: Reflux                       Venous Clinical Severity Score  Pain:1=Occasional, not restricting activity or requiring analgesics  Varicose Veins: 1=Few, scattered. Branch varicose veins  Venous Edema: 2=Afternoon edema, above ankle  Pigmentation: 1=Diffuse, but limited in area and old (brown)  Inflammation: 0=None  Induration: 0=None  Number of Active Ulcers: 0=0  Active Ulceration, Duration: 0=None  Active Ulcer Size: 0=None  Compressive Therapy: 0=Not used or not compliant  Total Score: 5     III. ASSESSMENT & PLAN (MEDICAL DECISION MAKING)     1. Hyperpigmentation of skin    2. Edema, lower extremity    3. Venous insufficiency    4. Leg pain, bilateral        Assessment/Diagnosis and Plan:  Ultrasound of lower extremities reveals positive evidence of venous insufficiency in the bilateral great saphenous veins.  Plan for conservative medical treatment at  this time. The patient may benefit from endovenous ablation in the future.     - Start compression with 15-20mmHg Rx stockings  - Therapeutic leg elevation  - Calf pumping exercises  - RTC 3 months for further evaluation       Brandan Babin DO

## 2023-03-10 ENCOUNTER — HOSPITAL ENCOUNTER (OUTPATIENT)
Dept: CARDIOLOGY | Facility: HOSPITAL | Age: 78
Discharge: HOME | End: 2023-03-10
Attending: STUDENT IN AN ORGANIZED HEALTH CARE EDUCATION/TRAINING PROGRAM
Payer: MEDICARE

## 2023-03-10 VITALS — HEIGHT: 62 IN | WEIGHT: 228 LBS | BODY MASS INDEX: 41.96 KG/M2

## 2023-03-10 DIAGNOSIS — R06.02 SHORTNESS OF BREATH: ICD-10-CM

## 2023-03-10 PROCEDURE — 93306 ECHO (CUPID ONLY): ICD-10-PCS | Mod: 26,,, | Performed by: STUDENT IN AN ORGANIZED HEALTH CARE EDUCATION/TRAINING PROGRAM

## 2023-03-10 PROCEDURE — 93306 TTE W/DOPPLER COMPLETE: CPT | Mod: 26,,, | Performed by: STUDENT IN AN ORGANIZED HEALTH CARE EDUCATION/TRAINING PROGRAM

## 2023-03-10 PROCEDURE — 93306 TTE W/DOPPLER COMPLETE: CPT

## 2023-03-14 LAB
AORTIC VALVE CUSP SEPERATION: 1.94 CM
AV INDEX (PROSTH): 0.73
AV MEAN GRADIENT: 5 MMHG
AV PEAK GRADIENT: 12 MMHG
AV VALVE AREA: 1.85 CM2
AV VELOCITY RATIO: 0.65
BSA FOR ECHO PROCEDURE: 2.13 M2
CV ECHO LV RWT: 0.39 CM
DOP CALC AO PEAK VEL: 1.7 M/S
DOP CALC AO VTI: 35.91 CM
DOP CALC LVOT AREA: 2.5 CM2
DOP CALC LVOT DIAMETER: 1.8 CM
DOP CALC LVOT PEAK VEL: 1.1 M/S
DOP CALC LVOT STROKE VOLUME: 66.33 CM3
DOP CALCLVOT PEAK VEL VTI: 26.08 CM
E WAVE DECELERATION TIME: 233 MSEC
ECHO LV POSTERIOR WALL: 0.93 CM (ref 0.6–1.1)
EJECTION FRACTION: 55 %
FRACTIONAL SHORTENING: 31 % (ref 28–44)
INTERVENTRICULAR SEPTUM: 1.22 CM (ref 0.6–1.1)
LEFT ATRIUM SIZE: 3.78 CM
LEFT INTERNAL DIMENSION IN SYSTOLE: 3.28 CM (ref 2.1–4)
LEFT VENTRICLE DIASTOLIC VOLUME INDEX: 51.43 ML/M2
LEFT VENTRICLE DIASTOLIC VOLUME: 103.89 ML
LEFT VENTRICLE MASS INDEX: 91 G/M2
LEFT VENTRICLE SYSTOLIC VOLUME INDEX: 21.5 ML/M2
LEFT VENTRICLE SYSTOLIC VOLUME: 43.49 ML
LEFT VENTRICULAR INTERNAL DIMENSION IN DIASTOLE: 4.73 CM (ref 3.5–6)
LEFT VENTRICULAR MASS: 183.5 G
LVOT MG: 2.2 MMHG
MV PEAK E VEL: 1.02 M/S
PISA TR MAX VEL: 2.87 M/S
RA PRESSURE: 3 MMHG
RIGHT ATRIAL AREA: 9.6 CM2
RIGHT VENTRICULAR END-DIASTOLIC DIMENSION: 2.2 CM
TR MAX PG: 33 MMHG
TRICUSPID ANNULAR PLANE SYSTOLIC EXCURSION: 2.2 CM
TV REST PULMONARY ARTERY PRESSURE: 36 MMHG

## 2023-05-04 ENCOUNTER — OFFICE VISIT (OUTPATIENT)
Dept: GASTROENTEROLOGY | Facility: CLINIC | Age: 78
End: 2023-05-04
Payer: MEDICARE

## 2023-05-04 VITALS
HEIGHT: 62 IN | OXYGEN SATURATION: 98 % | WEIGHT: 228 LBS | DIASTOLIC BLOOD PRESSURE: 74 MMHG | SYSTOLIC BLOOD PRESSURE: 179 MMHG | BODY MASS INDEX: 41.96 KG/M2 | HEART RATE: 76 BPM

## 2023-05-04 DIAGNOSIS — R10.32 LLQ ABDOMINAL PAIN: Primary | ICD-10-CM

## 2023-05-04 DIAGNOSIS — R13.14 PHARYNGOESOPHAGEAL DYSPHAGIA: ICD-10-CM

## 2023-05-04 PROCEDURE — 3077F SYST BP >= 140 MM HG: CPT | Mod: CPTII,,, | Performed by: INTERNAL MEDICINE

## 2023-05-04 PROCEDURE — 1126F AMNT PAIN NOTED NONE PRSNT: CPT | Mod: CPTII,,, | Performed by: INTERNAL MEDICINE

## 2023-05-04 PROCEDURE — 3078F PR MOST RECENT DIASTOLIC BLOOD PRESSURE < 80 MM HG: ICD-10-PCS | Mod: CPTII,,, | Performed by: INTERNAL MEDICINE

## 2023-05-04 PROCEDURE — 1159F PR MEDICATION LIST DOCUMENTED IN MEDICAL RECORD: ICD-10-PCS | Mod: CPTII,,, | Performed by: INTERNAL MEDICINE

## 2023-05-04 PROCEDURE — 3288F PR FALLS RISK ASSESSMENT DOCUMENTED: ICD-10-PCS | Mod: CPTII,,, | Performed by: INTERNAL MEDICINE

## 2023-05-04 PROCEDURE — 99204 PR OFFICE/OUTPT VISIT, NEW, LEVL IV, 45-59 MIN: ICD-10-PCS | Mod: S$PBB,,, | Performed by: INTERNAL MEDICINE

## 2023-05-04 PROCEDURE — 1159F MED LIST DOCD IN RCRD: CPT | Mod: CPTII,,, | Performed by: INTERNAL MEDICINE

## 2023-05-04 PROCEDURE — 99215 OFFICE O/P EST HI 40 MIN: CPT | Mod: PBBFAC | Performed by: INTERNAL MEDICINE

## 2023-05-04 PROCEDURE — 3077F PR MOST RECENT SYSTOLIC BLOOD PRESSURE >= 140 MM HG: ICD-10-PCS | Mod: CPTII,,, | Performed by: INTERNAL MEDICINE

## 2023-05-04 PROCEDURE — 1101F PR PT FALLS ASSESS DOC 0-1 FALLS W/OUT INJ PAST YR: ICD-10-PCS | Mod: CPTII,,, | Performed by: INTERNAL MEDICINE

## 2023-05-04 PROCEDURE — 3288F FALL RISK ASSESSMENT DOCD: CPT | Mod: CPTII,,, | Performed by: INTERNAL MEDICINE

## 2023-05-04 PROCEDURE — 99204 OFFICE O/P NEW MOD 45 MIN: CPT | Mod: S$PBB,,, | Performed by: INTERNAL MEDICINE

## 2023-05-04 PROCEDURE — 1126F PR PAIN SEVERITY QUANTIFIED, NO PAIN PRESENT: ICD-10-PCS | Mod: CPTII,,, | Performed by: INTERNAL MEDICINE

## 2023-05-04 PROCEDURE — 3078F DIAST BP <80 MM HG: CPT | Mod: CPTII,,, | Performed by: INTERNAL MEDICINE

## 2023-05-04 PROCEDURE — 1101F PT FALLS ASSESS-DOCD LE1/YR: CPT | Mod: CPTII,,, | Performed by: INTERNAL MEDICINE

## 2023-05-04 NOTE — PATIENT INSTRUCTIONS
- schedule EGD for dysphagia  - schedule colonoscopy for LLQ abdominal pain and h/o advanced polyps  - Follow up in clinic with GURPREET Velazquez/GIORGI, in 6 months.

## 2023-05-04 NOTE — PROGRESS NOTES
Gastroenterology Clinic Note  Patient ID: [id number]   Referring MD: No ref. provider found   Chief Complaint:   Chief Complaint   Patient presents with    Abdominal Pain     Abd pain x1 month       History of Present Illness   Quiana Bland is an 77 y.o. obese AAF with DM, HFpEF, h/o colon polyps who is referred for LLQ abdominal pain. Patient had an episode of LLQ abdominal pain several weeks ago that has since subsided. She has no abdominal pain today, but reports the pain she experienced was sharp, non-radiating, in the LLQ, and relieved after having a BM. She is constipated at baseline and relies on daily Linzess which does usually work well for her. She denies bleeding per rectum. She reports being referred to [facility] to have a large polyp removed ~3 years ago and was told to have another colonoscopy in 1 year but was lost to follow up. She also has a h/o GERD with intermittent symptoms usually controlled with tums, but has over the last year had progressive dysphagia (points to suprasternal notch) with solids.  No [facility] CRC.    Previous workup:    Last colonoscopy was at least 3 years ago per patient; no reports available for me to review    Review of Systems   Constitutional:  Negative for weight loss.   Gastrointestinal:  Positive for abdominal pain, constipation and heartburn. Negative for blood in stool, diarrhea, melena, nausea and vomiting.   All other systems reviewed and are negative.    Past Medical History      Past Medical History:   Diagnosis Date    CHF (congestive heart failure)     DM (diabetes mellitus)     High cholesterol     HTN (hypertension)     Seizures        Past Surgical History     Past Surgical History:   Procedure Laterality Date    CARDIAC CATHETERIZATION       SECTION      x 1    gall stones removed      HYSTERECTOMY      TUBAL LIGATION      VAGINAL DELIVERY      x 5       Allergies   Review of patient's allergies indicates:  No Known Allergies    Immunization History  "    There is no immunization history on file for this patient.    Past Family History      Family History   Problem Relation Age of Onset    Heart disease Mother     No Known Problems Father     Hypertension Sister     Hypertension Sister     Hypertension Brother     Diabetes Daughter     Cancer Son     Diabetes Son     No Known Problems Son     No Known Problems Son        Past Social History      Social History     Socioeconomic History    Marital status:    Tobacco Use    Smoking status: Never    Smokeless tobacco: Never   Substance and Sexual Activity    Drug use: Yes     Types: Hydrocodone    Sexual activity: Not Currently     Partners: Male     Comment: Spouse        Current Medications     No outpatient medications have been marked as taking for the 23 encounter (Office Visit) with Ace Pereira MD.        I have reviewed the current medications, allergies, vital signs, past medical and surgical history, family medical history, and social history for this encounter and agree with all findings.    OBJECTIVE    Physical Exam    BP (!) 179/74   Pulse 76   Ht 5' 2" (1.575 m)   Wt 103.4 kg (228 lb)   SpO2 98%   BMI 41.70 kg/m²   GEN: chronically ill appearing, obese, cooperative, NAD  HEENT: NCAT, OP benign, MMM  NECK: Supple, no LAD  CV: normal rate, regular rhythm  RESP: CTA bilaterally, unlabored  ABD: NABS, ND, NT, soft, no guarding  EXT: No clubbing, cyanosis, or edema  SKIN: Warm and dry  NEURO: AAO x4. Afocal.    LABS    CBC (with or without Differential):   Lab Results   Component Value Date    WBC 15.42 (H) 2023    HGB 12.1 2023    HCT 39.2 2023    MCV 93.6 2023    MCH 28.9 2023    MCHC 30.9 (L) 2023    RDW 14.6 (H) 2023     (H) 2023    MPV 10.8 2023    NEUTOPHILPCT 62.6 2023    DIFFTYPE Auto 2023     BMP/CMP:   Lab Results   Component Value Date     2023    K 4.2 2023     2023    " CO2 28 02/16/2023    BUN 17 02/16/2023    CREATININE 1.00 02/16/2023     (H) 02/16/2023    CALCIUM 10.0 02/16/2023        IMAGING  No pertinent imaging available     ASSESSMENT  Quiana Bland is a 77 y.o. obese AAF with DM, HFpEF, h/o colon polyps who is referred for LLQ abdominal pain and dysphagia to solids.     1. LLQ abdominal pain    2. Pharyngoesophageal dysphagia       - schedule colonoscopy for abdominal pain and h/o advanced polyp(s)  - schedule EGD with dilation - use Tums/pepcid for intermittent reflux symptoms; can given PPI after EGD if needed  - continue Linzess for constipation    PLAN  Patient Instructions   - schedule EGD for dysphagia  - schedule colonoscopy for LLQ abdominal pain and h/o advanced polyps  - Follow up in clinic with GURPREET Velazquez/GIORGI, in 6 months.    The risks and benefits of my recommendations, as well as other treatment options were discussed with the patient today. All questions were answered.      Ace Pereira MD  Gastroenterology

## 2023-06-08 ENCOUNTER — OFFICE VISIT (OUTPATIENT)
Dept: VASCULAR SURGERY | Facility: CLINIC | Age: 78
End: 2023-06-08
Payer: MEDICARE

## 2023-06-08 VITALS
RESPIRATION RATE: 18 BRPM | BODY MASS INDEX: 38.76 KG/M2 | DIASTOLIC BLOOD PRESSURE: 84 MMHG | WEIGHT: 210.63 LBS | SYSTOLIC BLOOD PRESSURE: 124 MMHG | HEIGHT: 62 IN | HEART RATE: 76 BPM

## 2023-06-08 DIAGNOSIS — M79.604 LEG PAIN, BILATERAL: ICD-10-CM

## 2023-06-08 DIAGNOSIS — L81.9 HYPERPIGMENTATION OF SKIN: Primary | ICD-10-CM

## 2023-06-08 DIAGNOSIS — R60.0 EDEMA, LOWER EXTREMITY: ICD-10-CM

## 2023-06-08 DIAGNOSIS — M79.605 LEG PAIN, BILATERAL: ICD-10-CM

## 2023-06-08 DIAGNOSIS — I87.2 VENOUS INSUFFICIENCY: ICD-10-CM

## 2023-06-08 PROCEDURE — 99214 OFFICE O/P EST MOD 30 MIN: CPT | Mod: S$PBB,,, | Performed by: FAMILY MEDICINE

## 2023-06-08 PROCEDURE — 1160F PR REVIEW ALL MEDS BY PRESCRIBER/CLIN PHARMACIST DOCUMENTED: ICD-10-PCS | Mod: CPTII,,, | Performed by: FAMILY MEDICINE

## 2023-06-08 PROCEDURE — 3079F DIAST BP 80-89 MM HG: CPT | Mod: CPTII,,, | Performed by: FAMILY MEDICINE

## 2023-06-08 PROCEDURE — 3074F SYST BP LT 130 MM HG: CPT | Mod: CPTII,,, | Performed by: FAMILY MEDICINE

## 2023-06-08 PROCEDURE — 1159F PR MEDICATION LIST DOCUMENTED IN MEDICAL RECORD: ICD-10-PCS | Mod: CPTII,,, | Performed by: FAMILY MEDICINE

## 2023-06-08 PROCEDURE — 3079F PR MOST RECENT DIASTOLIC BLOOD PRESSURE 80-89 MM HG: ICD-10-PCS | Mod: CPTII,,, | Performed by: FAMILY MEDICINE

## 2023-06-08 PROCEDURE — 1160F RVW MEDS BY RX/DR IN RCRD: CPT | Mod: CPTII,,, | Performed by: FAMILY MEDICINE

## 2023-06-08 PROCEDURE — 99214 PR OFFICE/OUTPT VISIT, EST, LEVL IV, 30-39 MIN: ICD-10-PCS | Mod: S$PBB,,, | Performed by: FAMILY MEDICINE

## 2023-06-08 PROCEDURE — 1159F MED LIST DOCD IN RCRD: CPT | Mod: CPTII,,, | Performed by: FAMILY MEDICINE

## 2023-06-08 PROCEDURE — 3074F PR MOST RECENT SYSTOLIC BLOOD PRESSURE < 130 MM HG: ICD-10-PCS | Mod: CPTII,,, | Performed by: FAMILY MEDICINE

## 2023-06-08 PROCEDURE — 99215 OFFICE O/P EST HI 40 MIN: CPT | Mod: PBBFAC | Performed by: FAMILY MEDICINE

## 2023-06-08 RX ORDER — NEOMYCIN SULFATE, POLYMYXIN B SULFATE AND DEXAMETHASONE 3.5; 10000; 1 MG/ML; [USP'U]/ML; MG/ML
SUSPENSION/ DROPS OPHTHALMIC
COMMUNITY
Start: 2023-05-30

## 2023-06-08 RX ORDER — PANTOPRAZOLE SODIUM 40 MG/1
TABLET, DELAYED RELEASE ORAL
COMMUNITY
Start: 2023-05-12

## 2023-06-08 RX ORDER — OXYCODONE AND ACETAMINOPHEN 10; 325 MG/1; MG/1
TABLET ORAL
COMMUNITY
Start: 2023-05-12

## 2023-06-08 RX ORDER — VALSARTAN 160 MG/1
TABLET ORAL
COMMUNITY
Start: 2023-04-19

## 2023-06-08 NOTE — PROGRESS NOTES
[facility] CLINIC NOTE    Patient ID: Quiana Bland is a 77 y.o. female.    I. HISTORY     Chief Complaint:   Chief Complaint   Patient presents with    Follow-up     Exam room 2.  3 months in compression        HPI: Quiana Bland is a 77 y.o. female who the patient presents following 3 months of conservative therapy for chronic venous insufficiency consisting of 15-20 millimeter of mercury compression socks, calf pumping exercises and therapeutic leg elevation.  The patient states that despite these measures she continues to have daily leg pain, swelling, heaviness and tightness.  She feels as though these measures have helped very little and continues to have daily symptoms.  She considers these symptoms to be life altering would like to have the underlying condition corrected if possible    Patient initially seen on 02/28/2023 by referral from Dr Ramírez, cardiology, for evaluation of bilateral lower extremity leg swelling and discomfort.  Symptoms are progressive/worsening and began a proximally 2 years ago.  Location is bilateral lower extremities below the knees. Symptoms are worse at the end of the day.  History of venous interventions includes none.  Unknown family history of venous disease.  Patient also sees Dr. Lozoya, pulmonology, and has a history of obstructive sleep apnea and uses a CPAP nightly.  She also sleeps in a chair secondary to orthopnea.  Also complains of right knee pain and swelling and limited ambulation throughout the day.  Recent negative BNP.  Echocardiogram pending.    Bilateral complete venous reflux study 03/08/2023, shows no evidence of DVT bilaterally.  The study also shows dilation and axial reflux of the bilateral great saphenous veins.    Venous Disease Medical Necessity Documentation Initial Visit Date:  02/28/2023 Return Check Date: 06/08/2023   Have you ever had a rupture or bleed from a varicose vein in your leg(s)?              [] Yes  [x] No   [] Yes   [x] No    Have you ever been diagnosed with phlebitis, cellulitis, or inflammation in the area of the varicose veins of  your leg(s)?  [] Yes  [x] No    [] Yes   [x] No   Do you have darkened or inflamed skin on your legs?   [] Yes   [x] No   [] Yes   [x] No   Do you have leg swelling?     [x] Yes   [] No   [x] Yes   [] No   Do you have leg pain?   [x] Yes   [] No   [x] Yes   [] No   If yes, describe the type of pain?    [x]   Stabbing [x]  Radiating [x]  Aching   [x]  Tightness [x]  Throbbing               [x]  Burning [x]  Cramping          No change    Do you have leg discomfort?   [x] Yes   [] No   [x] Yes   [] No   If yes, describe the type of discomfort?    [x]  Heaviness [x]  Fullness   [x]  Restlessness [x] Tired/Fatigued [x] Itching          No change except now no itching    Have you ever worn compression hose?   [x] Yes   [] No   [x] Yes   [] No   If yes, how long?    18 months    3 months   Do you elevate your legs while sitting?   [x] Yes   [] No   [x] Yes   [] No   Does venous disease (varicose veins, ulcers, skin changes, leg pain/swelling) interfere with your daily life?  If yes, check activities you are limited or unable to do.    [x]  Shower  [x]   Walk  []  Exercise  [x] Play with children/grandchildren  [x]  Shop [] Work [x] Stand for any period of time [x] Sleep                               [x] Sitting for an extended period of time.           [x] Yes   [] No   [x] Yes   [] No   No change   Do you exercise/have you tried to exercise (i.e.  Walk our participate in a regular exercise routine)?  [] Yes  [x] No   [] Yes   [x] No   BMI 41.8   38.5          Past Medical History:   Diagnosis Date    CHF (congestive heart failure)     DM (diabetes mellitus)     High cholesterol     HTN (hypertension)     Seizures         Past Surgical History:   Procedure Laterality Date    CARDIAC CATHETERIZATION       SECTION      x 1    gall stones removed      HYSTERECTOMY      TUBAL LIGATION      VAGINAL DELIVERY       x 5       Social History     Tobacco Use   Smoking Status Never   Smokeless Tobacco Never         Current Outpatient Medications:     albuterol 2 mg/5 mL syrup, take 1/2 (one-half) TEASPOONFUL (2.5mls) BY MOUTH THREE TIMES DAILY, Disp: , Rfl:     albuterol-ipratropium (DUO-NEB) 2.5 mg-0.5 mg/3 mL nebulizer solution, Ipratropium-Albuterol 0.5-2.5 (3) MG/3ML Inhalation Solution QTY: 120 mL Days: 30 Refills: 11  Written: 05/09/22 Patient Instructions: USE 1 VIAL IN NEBULIZER 4 TIMES DAILY, Disp: , Rfl:     azelastine (ASTELIN) 137 mcg (0.1 %) nasal spray, 1 spray 2 (two) times daily., Disp: , Rfl:     budesonide (PULMICORT) 0.5 mg/2 mL nebulizer solution, Budesonide 0.5 MG/2ML Inhalation Suspension QTY: 120 mL Days: 30 Refills: 11  Written: 05/09/22 Patient Instructions: USE 1 VIAL  IN  NEBULIZER TWICE  DAILY - rinse mouth out after each use, Disp: , Rfl:     cyanocobalamin 1,000 mcg/mL injection, Cyanocobalamin 1000 MCG/ML Injection Solution QTY: 1 mL Days: 0 Refills: 0  Written: 04/13/22 Patient Instructions: ONCE MONTHLY, Disp: , Rfl:     dapagliflozin (FARXIGA) 10 mg tablet, Take 1 tablet (10 mg total) by mouth once daily., Disp: 90 tablet, Rfl: 3    fluticasone propionate (FLONASE) 50 mcg/actuation nasal spray, Fluticasone Propionate 50 MCG/ACT Nasal Suspension QTY: 1 gram Days: 30 Refills: 3  Written: 01/11/22 Patient Instructions: twice a day 1 spray each nostril, Disp: , Rfl:     glimepiride (AMARYL) 4 MG tablet, , Disp: , Rfl:     isosorbide mononitrate (IMDUR) 30 MG 24 hr tablet, Take 1 tablet (30 mg total) by mouth once daily., Disp: 90 tablet, Rfl: 3    levalbuterol (XOPENEX HFA) 45 mcg/actuation inhaler, Xopenex HFA 45 MCG/ACT Inhalation Aerosol QTY: 0  Days: 0 Refills: 0  Written: 07/07/22 Patient Instructions:, Disp: , Rfl:     LINZESS 145 mcg Cap capsule, TAKE ONE CAPSULE BY MOUTH ONCE DAILY FOR constipation, Disp: , Rfl:     losartan (COZAAR) 100 MG tablet, Losartan Potassium 100 MG Oral Tablet  QTY: 90 tablet Days: 90 Refills: 3  Written: 08/03/22 Patient Instructions: once a day, Disp: , Rfl:     metFORMIN (GLUCOPHAGE) 500 MG tablet, , Disp: , Rfl:     metoprolol succinate (TOPROL-XL) 100 MG 24 hr tablet, Take 1 tablet (100 mg total) by mouth once daily., Disp: 90 tablet, Rfl: 3    morphine (MS CONTIN) 15 MG 12 hr tablet, Take 15 mg by mouth nightly., Disp: , Rfl:     neomycin-polymyxin-dexamethasone (MAXITROL) 3.5mg/mL-10,000 unit/mL-0.1 % DrpS, 1 drop into right EYE FOUR TIMES DAILY FOR 10 DAYS, Disp: , Rfl:     olopatadine (PATANOL) 0.1 % ophthalmic solution, Pataday 0.1% Ophthalmic Solution QTY: 0  Days: 0 Refills: 0  Written: 08/10/22 Patient Instructions:, Disp: , Rfl:     oxyCODONE-acetaminophen (PERCOCET)  mg per tablet, TAKE ONE TABLET BY MOUTH 2 TO 3 times DAILY AS NEEDED FOR PAIN, Disp: , Rfl:     pantoprazole (PROTONIX) 40 MG tablet, , Disp: , Rfl:     repaglinide (PRANDIN) 1 MG tablet, Repaglinide 1 MG Oral Tablet QTY: 60 tablet Days: 30 Refills: 5  Written: 04/11/22 Patient Instructions: twice a day with food, Disp: , Rfl:     RYMED, DEXCHLORPHENIRAMINE-PE, 2-10 mg Tab, Take 1 tablet by mouth 2 (two) times daily., Disp: , Rfl:     spironolactone-hydrochlorothiazide 25-25mg (ALDACTAZIDE) 25-25 mg Tab, Spironolactone-HCTZ 25-25 MG Oral Tablet QTY: 0 tablet Days: 7 Refills: 0  Written: 11/10/22 Patient Instructions:, Disp: , Rfl:     valsartan (DIOVAN) 160 MG tablet, TAKE ONE TABLET BY MOUTH DAILY (STOP losartan), Disp: , Rfl:     Review of Systems   Constitutional:  Negative for activity change, chills, diaphoresis, fatigue and fever.   Respiratory:  Negative for cough and shortness of breath.    Cardiovascular:  Positive for leg swelling. Negative for chest pain and claudication.        Hyperpigmentation LE   Gastrointestinal:  Negative for nausea and vomiting.   Musculoskeletal:  Positive for leg pain. Negative for joint swelling.   Integumentary:  Negative for rash and wound.    Neurological:  Negative for weakness and numbness.        II. PHYSICAL EXAM     Physical Exam  Constitutional:       General: She is awake. She is not in acute distress.     Appearance: Normal appearance. She is obese. She is not ill-appearing or toxic-appearing.   HENT:      Head: Normocephalic and atraumatic.   Eyes:      Extraocular Movements: Extraocular movements intact.      Conjunctiva/sclera: Conjunctivae normal.      Pupils: Pupils are equal, round, and reactive to light.   Neck:      Vascular: No carotid bruit or JVD.   Cardiovascular:      Rate and Rhythm: Normal rate and regular rhythm.      Pulses:           Dorsalis pedis pulses are detected w/ Doppler on the right side and detected w/ Doppler on the left side.        Posterior tibial pulses are detected w/ Doppler on the right side and detected w/ Doppler on the left side.      Heart sounds: No murmur heard.  Pulmonary:      Effort: Pulmonary effort is normal. No respiratory distress.      Breath sounds: No stridor. No wheezing, rhonchi or rales.   Musculoskeletal:         General: No swelling, tenderness or deformity.      Right lower le+ Edema present.      Left lower le+ Edema present.      Comments: No evidence of cellulitis, weeping or open ulceration bilaterally.   Feet:      Comments: Triphasic hand-held dopplerable pulses of the bilateral dorsalis pedis and posterior tibial arteries.  Skin:     General: Skin is warm.      Capillary Refill: Capillary refill takes less than 2 seconds.      Coloration: Skin is not ashen.      Findings: No bruising, erythema, lesion, rash or wound.   Neurological:      Mental Status: She is alert and oriented to person, place, and time.      Motor: No weakness.   Psychiatric:         Speech: Speech normal.         Behavior: Behavior normal. Behavior is cooperative.       Reticular/Spider veins noted:  RLE: none  LLE: none    Varicose veins noted:  RLE: none  LLE:  none    CEAP Classification  Clinical  Signs: Class 4 - Skin changes ascribed to venous disease  Etiologic Classification: Primary  Anatomic distribution: Superficial  Pathophysiologic dysfunction: Reflux                       Venous Clinical Severity Score  Pain:2=Daily, moderate activity limitation, occasional analgesics  Varicose Veins: 1=Few, scattered. Branch varicose veins  Venous Edema: 2=Afternoon edema, above ankle  Pigmentation: 1=Diffuse, but limited in area and old (brown)  Inflammation: 0=None  Induration: 0=None  Number of Active Ulcers: 0=0  Active Ulceration, Duration: 0=None  Active Ulcer Size: 0=None  Compressive Therapy: 3=Full compliance, stockings + elevation  Total Score: 9     III. ASSESSMENT & PLAN (MEDICAL DECISION MAKING)     1. Hyperpigmentation of skin    2. Leg pain, bilateral    3. Edema, lower extremity    4. Venous insufficiency        Assessment/Diagnosis and Plan:  The patient continues to have sequela of chronic venous insufficiency despite over 3 months of conservative therapy. The patient continues to have life altering symptoms as well as a positive reflux study as noted in the history of present illness above. At this time I believe it would be reasonable to proceed with a bilateral great saphenous vein endovenous ablation for symptomatic venous insufficiency.  Untreated venous disease increases the patient's risk for cellulitis, deep vein thrombosis, chronic skin changes and venous ulceration.  Risk and benefits of the procedure were explained to the patient and the patient wishes to proceed. The patient does not have overly distended veins and denies history of DVT/PE and will not require prophylactic anticoagulation.        Brandan Babin,

## 2023-06-09 DIAGNOSIS — I87.2 VENOUS (PERIPHERAL) INSUFFICIENCY: ICD-10-CM

## 2023-06-09 DIAGNOSIS — I87.2 VENOUS INSUFFICIENCY (CHRONIC) (PERIPHERAL): Primary | ICD-10-CM

## 2023-06-09 RX ORDER — SODIUM CHLORIDE 9 MG/ML
INJECTION, SOLUTION INTRAVENOUS CONTINUOUS
Status: CANCELLED | OUTPATIENT
Start: 2023-07-28

## 2023-06-09 RX ORDER — SODIUM CHLORIDE, SODIUM LACTATE, POTASSIUM CHLORIDE, CALCIUM CHLORIDE 600; 310; 30; 20 MG/100ML; MG/100ML; MG/100ML; MG/100ML
INJECTION, SOLUTION INTRAVENOUS CONTINUOUS
Status: CANCELLED | OUTPATIENT
Start: 2023-07-21

## 2023-07-21 ENCOUNTER — HOSPITAL ENCOUNTER (OUTPATIENT)
Facility: HOSPITAL | Age: 78
Discharge: HOME | End: 2023-07-21
Attending: FAMILY MEDICINE | Admitting: FAMILY MEDICINE
Payer: MEDICARE

## 2023-07-21 ENCOUNTER — ANESTHESIA EVENT (OUTPATIENT)
Dept: SURGERY | Facility: HOSPITAL | Age: 78
End: 2023-07-21
Payer: MEDICARE

## 2023-07-21 ENCOUNTER — ANESTHESIA (OUTPATIENT)
Dept: SURGERY | Facility: HOSPITAL | Age: 78
End: 2023-07-21
Payer: MEDICARE

## 2023-07-21 VITALS
SYSTOLIC BLOOD PRESSURE: 127 MMHG | TEMPERATURE: 98 F | HEIGHT: 62 IN | WEIGHT: 219 LBS | OXYGEN SATURATION: 97 % | DIASTOLIC BLOOD PRESSURE: 69 MMHG | HEART RATE: 63 BPM | BODY MASS INDEX: 40.3 KG/M2 | RESPIRATION RATE: 16 BRPM

## 2023-07-21 DIAGNOSIS — I87.2 VENOUS INSUFFICIENCY: ICD-10-CM

## 2023-07-21 DIAGNOSIS — I87.2 VENOUS INSUFFICIENCY (CHRONIC) (PERIPHERAL): Primary | ICD-10-CM

## 2023-07-21 DIAGNOSIS — I87.2 VENOUS INSUFFICIENCY: Primary | ICD-10-CM

## 2023-07-21 LAB
GLUCOSE SERPL-MCNC: 123 MG/DL (ref 70–105)
GLUCOSE SERPL-MCNC: 140 MG/DL (ref 70–105)

## 2023-07-21 PROCEDURE — D9220A PRA ANESTHESIA: ICD-10-PCS | Mod: ANES,,, | Performed by: ANESTHESIOLOGY

## 2023-07-21 PROCEDURE — 25000003 PHARM REV CODE 250: Performed by: FAMILY MEDICINE

## 2023-07-21 PROCEDURE — D9220A PRA ANESTHESIA: Mod: CRNA,,, | Performed by: NURSE ANESTHETIST, CERTIFIED REGISTERED

## 2023-07-21 PROCEDURE — 36000705 HC OR TIME LEV I EA ADD 15 MIN: Performed by: FAMILY MEDICINE

## 2023-07-21 PROCEDURE — 36475 ENDOVENOUS RF 1ST VEIN: CPT | Mod: RT,,, | Performed by: FAMILY MEDICINE

## 2023-07-21 PROCEDURE — 25000003 PHARM REV CODE 250: Performed by: NURSE ANESTHETIST, CERTIFIED REGISTERED

## 2023-07-21 PROCEDURE — 63600175 PHARM REV CODE 636 W HCPCS: Performed by: NURSE ANESTHETIST, CERTIFIED REGISTERED

## 2023-07-21 PROCEDURE — 71000033 HC RECOVERY, INTIAL HOUR: Performed by: FAMILY MEDICINE

## 2023-07-21 PROCEDURE — 36000704 HC OR TIME LEV I 1ST 15 MIN: Performed by: FAMILY MEDICINE

## 2023-07-21 PROCEDURE — 82962 GLUCOSE BLOOD TEST: CPT | Mod: 91

## 2023-07-21 PROCEDURE — 36475 PR ENDOVENOUS RF, 1ST VEIN: ICD-10-PCS | Mod: RT,,, | Performed by: FAMILY MEDICINE

## 2023-07-21 PROCEDURE — 63600175 PHARM REV CODE 636 W HCPCS: Performed by: FAMILY MEDICINE

## 2023-07-21 PROCEDURE — 37000009 HC ANESTHESIA EA ADD 15 MINS: Performed by: FAMILY MEDICINE

## 2023-07-21 PROCEDURE — D9220A PRA ANESTHESIA: Mod: ANES,,, | Performed by: ANESTHESIOLOGY

## 2023-07-21 PROCEDURE — 37000008 HC ANESTHESIA 1ST 15 MINUTES: Performed by: FAMILY MEDICINE

## 2023-07-21 PROCEDURE — C1888 ENDOVAS NON-CARDIAC ABL CATH: HCPCS | Performed by: FAMILY MEDICINE

## 2023-07-21 PROCEDURE — 71000015 HC POSTOP RECOV 1ST HR: Performed by: FAMILY MEDICINE

## 2023-07-21 PROCEDURE — 27000284 HC CANNULA NASAL: Performed by: NURSE ANESTHETIST, CERTIFIED REGISTERED

## 2023-07-21 PROCEDURE — D9220A PRA ANESTHESIA: ICD-10-PCS | Mod: CRNA,,, | Performed by: NURSE ANESTHETIST, CERTIFIED REGISTERED

## 2023-07-21 RX ORDER — ONDANSETRON 2 MG/ML
4 INJECTION INTRAMUSCULAR; INTRAVENOUS DAILY PRN
Status: DISCONTINUED | OUTPATIENT
Start: 2023-07-21 | End: 2023-07-21 | Stop reason: HOSPADM

## 2023-07-21 RX ORDER — MUPIROCIN 20 MG/G
OINTMENT TOPICAL
Status: DISCONTINUED | OUTPATIENT
Start: 2023-07-21 | End: 2023-07-21 | Stop reason: HOSPADM

## 2023-07-21 RX ORDER — DIPHENHYDRAMINE HYDROCHLORIDE 50 MG/ML
25 INJECTION INTRAMUSCULAR; INTRAVENOUS EVERY 6 HOURS PRN
Status: DISCONTINUED | OUTPATIENT
Start: 2023-07-21 | End: 2023-07-21 | Stop reason: HOSPADM

## 2023-07-21 RX ORDER — MIDAZOLAM HYDROCHLORIDE 1 MG/ML
INJECTION INTRAMUSCULAR; INTRAVENOUS
Status: DISCONTINUED | OUTPATIENT
Start: 2023-07-21 | End: 2023-07-21

## 2023-07-21 RX ORDER — SODIUM CHLORIDE, SODIUM LACTATE, POTASSIUM CHLORIDE, CALCIUM CHLORIDE 600; 310; 30; 20 MG/100ML; MG/100ML; MG/100ML; MG/100ML
INJECTION, SOLUTION INTRAVENOUS CONTINUOUS
Status: DISCONTINUED | OUTPATIENT
Start: 2023-07-21 | End: 2023-07-21 | Stop reason: HOSPADM

## 2023-07-21 RX ORDER — PROPOFOL 10 MG/ML
VIAL (ML) INTRAVENOUS
Status: DISCONTINUED | OUTPATIENT
Start: 2023-07-21 | End: 2023-07-21

## 2023-07-21 RX ORDER — LIDOCAINE HYDROCHLORIDE 20 MG/ML
INJECTION, SOLUTION EPIDURAL; INFILTRATION; INTRACAUDAL; PERINEURAL
Status: DISCONTINUED | OUTPATIENT
Start: 2023-07-21 | End: 2023-07-21

## 2023-07-21 RX ADMIN — PROPOFOL 30 MG: 10 INJECTION, EMULSION INTRAVENOUS at 11:07

## 2023-07-21 RX ADMIN — PROPOFOL 50 MG: 10 INJECTION, EMULSION INTRAVENOUS at 11:07

## 2023-07-21 RX ADMIN — LIDOCAINE HYDROCHLORIDE: 10 INJECTION, SOLUTION INFILTRATION; PERINEURAL at 12:07

## 2023-07-21 RX ADMIN — LIDOCAINE HYDROCHLORIDE 60 MG: 20 INJECTION, SOLUTION INTRAVENOUS at 11:07

## 2023-07-21 RX ADMIN — SODIUM CHLORIDE, POTASSIUM CHLORIDE, SODIUM LACTATE AND CALCIUM CHLORIDE: 600; 310; 30; 20 INJECTION, SOLUTION INTRAVENOUS at 08:07

## 2023-07-21 RX ADMIN — MIDAZOLAM 1 MG: 1 INJECTION INTRAMUSCULAR; INTRAVENOUS at 11:07

## 2023-07-21 NOTE — ANESTHESIA PREPROCEDURE EVALUATION
07/21/2023  Quiana Bland is a 77 y.o., female.      Pre-op Assessment    I have reviewed the Patient Summary Reports.     I have reviewed the Nursing Notes. I have reviewed the NPO Status.   I have reviewed the Medications.     Review of Systems  Anesthesia Hx:  No problems with previous Anesthesia  Denies Family Hx of Anesthesia complications.   Denies Personal Hx of Anesthesia complications.   Social:  No Alcohol Use, Non-Smoker Advanced age   Cardiovascular:   Hypertension CHF PVD hyperlipidemia    Neurological:   CVA, no residual symptoms Seizures    Endocrine:   Diabetes, type 2  Morbid Obesity / BMI > 40      Physical Exam  General: Well nourished, Cooperative and Alert    Airway:  Mallampati: II   Mouth Opening: Normal  TM Distance: Normal  Tongue: Normal  Neck ROM: Normal ROM    Chest/Lungs:  Clear to auscultation, Normal Respiratory Rate    Heart:  Rate: Normal  Rhythm: Regular Rhythm        Chemistry        Component Value Date/Time     02/16/2023 1154    K 4.2 02/16/2023 1154     02/16/2023 1154    CO2 28 02/16/2023 1154    BUN 17 02/16/2023 1154    CREATININE 1.00 02/16/2023 1154     (H) 02/16/2023 1154        Component Value Date/Time    CALCIUM 10.0 02/16/2023 1154        Lab Results   Component Value Date    WBC 15.42 (H) 02/16/2023    HGB 12.1 02/16/2023    HCT 39.2 02/16/2023     (H) 02/16/2023     Results for orders placed or performed in visit on 02/16/23   EKG 12-lead    Collection Time: 02/16/23 10:07 AM    Narrative    Test Reason : I10,    Vent. Rate : 086 BPM     Atrial Rate : 086 BPM     P-R Int : 146 ms          QRS Dur : 080 ms      QT Int : 368 ms       P-R-T Axes : 060 003 -78 degrees     QTc Int : 440 ms    Normal sinus rhythm  ST and T wave abnormality, consider inferior ischemia  ST and T wave abnormality, consider anterolateral ischemia  Abnormal  ECG  When compared with ECG of 14-FEB-2022 10:47,  No significant change was found  Confirmed by Guido GAN, Robert COSBY ([id number]) on 3/1/2023 3:53:39 PM    Referred By:             Confirmed By:Robert Lora MD         Anesthesia Plan  Type of Anesthesia, risks & benefits discussed:    Anesthesia Type: Gen Natural Airway, MAC  Intra-op Monitoring Plan: Standard ASA Monitors  Post Op Pain Control Plan: multimodal analgesia  Induction:  IV  Informed Consent: Informed consent signed with the Patient and all parties understand the risks and agree with anesthesia plan.  All questions answered.   ASA Score: 3  Day of Surgery Review of History & Physical: H&P Update referred to the surgeon/provider.I have interviewed and examined the patient. I have reviewed the patient's H&P dated: There are no significant changes.     Ready For Surgery From Anesthesia Perspective.     .

## 2023-07-21 NOTE — TRANSFER OF CARE
"Anesthesia Transfer of Care Note    Patient: Quiana Bland    Procedure(s) Performed: Procedure(s) (LRB):  Right GSV RF Ablation (Right)    Patient location: PACU    Anesthesia Type: general    Transport from OR: Transported from OR on room air with adequate spontaneous ventilation    Post pain: adequate analgesia    Post assessment: no apparent anesthetic complications    Post vital signs: stable    Level of consciousness: awake and alert    Nausea/Vomiting: no nausea/vomiting    Complications: none    Transfer of care protocol was followed      Last vitals:   Visit Vitals  BP (!) 152/75 (BP Location: Right arm, Patient Position: Lying)   Pulse 67   Temp 36.6 °C (97.8 °F) (Oral)   Resp (!) 23   Ht 5' 2" (1.575 m)   Wt 99.3 kg (219 lb)   SpO2 96%   Breastfeeding No   BMI 40.06 kg/m²     "

## 2023-07-21 NOTE — ANESTHESIA POSTPROCEDURE EVALUATION
Anesthesia Post Evaluation    Patient: Quiana Bland    Procedure(s) Performed: Procedure(s) (LRB):  Right GSV RF Ablation (Right)    Final Anesthesia Type: MAC      Patient location during evaluation: PACU  Patient participation: Yes- Able to Participate  Level of consciousness: awake and alert and oriented  Post-procedure vital signs: reviewed and stable  Pain management: adequate  Airway patency: patent  ROSELINE mitigation strategies: Multimodal analgesia  PONV status at discharge: No PONV  Anesthetic complications: no      Cardiovascular status: hemodynamically stable  Respiratory status: unassisted and spontaneous ventilation  Hydration status: euvolemic  Follow-up needed           Vitals Value Taken Time   /75 07/21/23 1224   Temp 36.6 °C (97.8 °F) 07/21/23 1224   Pulse 64 07/21/23 1226   Resp 21 07/21/23 1226   SpO2 98 % 07/21/23 1226   Vitals shown include unvalidated device data.      No case tracking events are documented in the log.      Pain/Sergo Score: No data recorded

## 2023-07-21 NOTE — DISCHARGE SUMMARY
[facility] - Periop Services  Discharge Note  Short Stay    Procedure(s) (LRB):  Right GSV RF Ablation (Right)      OUTCOME: Patient tolerated treatment/procedure well without complication and is now ready for discharge.    DISPOSITION: Home or Self Care    FINAL DIAGNOSIS:  Venous insufficiency    FOLLOWUP: In clinic    DISCHARGE INSTRUCTIONS:  No discharge procedures on file.     TIME SPENT ON DISCHARGE: 5 minutes

## 2023-07-21 NOTE — OR NURSING
1220 REC'D TO PACU IN STABLE COND. PT SLEEPING, WAKES TO VOICE. CONNECTED TO BP CUFF, EKG LEADS & SPO2 MONITORS. VS STABLE. PT HAD A RIGHT GSV ABLATION TODAY. NO DISTRESS NOTED @ THIS TIME, WILL CONT TO MONITOR.        1250 TRANSFERRED PT BACK TO OUT PT ROOM 22 IN STABLE COND. BEDSIDE REPORT GIVEN TO  CARLOS PAULINO. NO DISTRESS NOTED @ THIS TIME. VS STABLE  98% 64  16 163/77

## 2023-07-21 NOTE — H&P
Patient ID: Quiana Bland is a 77 y.o. female.     I. HISTORY      Chief Complaint:        Chief Complaint   Patient presents with    Follow-up       Exam room 2.  3 months in compression         HPI: Quiana Bland is a 77 y.o. female who the patient presents following 3 months of conservative therapy for chronic venous insufficiency consisting of 15-20 millimeter of mercury compression socks, calf pumping exercises and therapeutic leg elevation.  The patient states that despite these measures she continues to have daily leg pain, swelling, heaviness and tightness.  She feels as though these measures have helped very little and continues to have daily symptoms.  She considers these symptoms to be life altering would like to have the underlying condition corrected if possible     Patient initially seen on 02/28/2023 by referral from Dr Ramírez, cardiology, for evaluation of bilateral lower extremity leg swelling and discomfort.  Symptoms are progressive/worsening and began a proximally 2 years ago.  Location is bilateral lower extremities below the knees. Symptoms are worse at the end of the day.  History of venous interventions includes none.  Unknown family history of venous disease.  Patient also sees Dr. Lozoya, pulmonology, and has a history of obstructive sleep apnea and uses a CPAP nightly.  She also sleeps in a chair secondary to orthopnea.  Also complains of right knee pain and swelling and limited ambulation throughout the day.  Recent negative BNP.  Echocardiogram pending.     Bilateral complete venous reflux study 03/08/2023, shows no evidence of DVT bilaterally.  The study also shows dilation and axial reflux of the bilateral great saphenous veins.     Venous Disease Medical Necessity Documentation Initial Visit Date:  02/28/2023 Return Check Date: 06/08/2023   Have you ever had a rupture or bleed from a varicose vein in your leg(s)?              [] Yes  [x] No   [] Yes   [x] No   Have you ever  been diagnosed with phlebitis, cellulitis, or inflammation in the area of the varicose veins of  your leg(s)?  [] Yes  [x] No    [] Yes   [x] No   Do you have darkened or inflamed skin on your legs?   [] Yes   [x] No   [] Yes   [x] No   Do you have leg swelling?      [x] Yes   [] No   [x] Yes   [] No   Do you have leg pain?   [x] Yes   [] No   [x] Yes   [] No   If yes, describe the type of pain?    [x]   Stabbing [x]  Radiating [x]  Aching   [x]  Tightness [x]  Throbbing               [x]  Burning [x]  Cramping           No change    Do you have leg discomfort?   [x] Yes   [] No   [x] Yes   [] No   If yes, describe the type of discomfort?    [x]  Heaviness [x]  Fullness   [x]  Restlessness [x] Tired/Fatigued [x] Itching           No change except now no itching    Have you ever worn compression hose?   [x] Yes   [] No   [x] Yes   [] No   If yes, how long?    18 months    3 months   Do you elevate your legs while sitting?   [x] Yes   [] No   [x] Yes   [] No   Does venous disease (varicose veins, ulcers, skin changes, leg pain/swelling) interfere with your daily life?  If yes, check activities you are limited or unable to do.     [x]  Shower  [x]   Walk  []  Exercise  [x] Play with children/grandchildren  [x]  Shop [] Work [x] Stand for any period of time [x] Sleep                               [x] Sitting for an extended period of time.              [x] Yes   [] No   [x] Yes   [] No   No change   Do you exercise/have you tried to exercise (i.e.  Walk our participate in a regular exercise routine)?  [] Yes  [x] No   [] Yes   [x] No   BMI 41.8   38.5                  Past Medical History:   Diagnosis Date    CHF (congestive heart failure)      DM (diabetes mellitus)      High cholesterol      HTN (hypertension)      Seizures                 Past Surgical History:   Procedure Laterality Date    CARDIAC CATHETERIZATION         SECTION         x 1    gall stones removed        HYSTERECTOMY        TUBAL LIGATION         VAGINAL DELIVERY         x 5         Social History          Tobacco Use   Smoking Status Never   Smokeless Tobacco Never            Current Outpatient Medications:     albuterol 2 mg/5 mL syrup, take 1/2 (one-half) TEASPOONFUL (2.5mls) BY MOUTH THREE TIMES DAILY, Disp: , Rfl:     albuterol-ipratropium (DUO-NEB) 2.5 mg-0.5 mg/3 mL nebulizer solution, Ipratropium-Albuterol 0.5-2.5 (3) MG/3ML Inhalation Solution QTY: 120 mL Days: 30 Refills: 11  Written: 05/09/22 Patient Instructions: USE 1 VIAL IN NEBULIZER 4 TIMES DAILY, Disp: , Rfl:     azelastine (ASTELIN) 137 mcg (0.1 %) nasal spray, 1 spray 2 (two) times daily., Disp: , Rfl:     budesonide (PULMICORT) 0.5 mg/2 mL nebulizer solution, Budesonide 0.5 MG/2ML Inhalation Suspension QTY: 120 mL Days: 30 Refills: 11  Written: 05/09/22 Patient Instructions: USE 1 VIAL  IN  NEBULIZER TWICE  DAILY - rinse mouth out after each use, Disp: , Rfl:     cyanocobalamin 1,000 mcg/mL injection, Cyanocobalamin 1000 MCG/ML Injection Solution QTY: 1 mL Days: 0 Refills: 0  Written: 04/13/22 Patient Instructions: ONCE MONTHLY, Disp: , Rfl:     dapagliflozin (FARXIGA) 10 mg tablet, Take 1 tablet (10 mg total) by mouth once daily., Disp: 90 tablet, Rfl: 3    fluticasone propionate (FLONASE) 50 mcg/actuation nasal spray, Fluticasone Propionate 50 MCG/ACT Nasal Suspension QTY: 1 gram Days: 30 Refills: 3  Written: 01/11/22 Patient Instructions: twice a day 1 spray each nostril, Disp: , Rfl:     glimepiride (AMARYL) 4 MG tablet, , Disp: , Rfl:     isosorbide mononitrate (IMDUR) 30 MG 24 hr tablet, Take 1 tablet (30 mg total) by mouth once daily., Disp: 90 tablet, Rfl: 3    levalbuterol (XOPENEX HFA) 45 mcg/actuation inhaler, Xopenex HFA 45 MCG/ACT Inhalation Aerosol QTY: 0  Days: 0 Refills: 0  Written: 07/07/22 Patient Instructions:, Disp: , Rfl:     LINZESS 145 mcg Cap capsule, TAKE ONE CAPSULE BY MOUTH ONCE DAILY FOR constipation, Disp: , Rfl:     losartan (COZAAR) 100 MG tablet,  Losartan Potassium 100 MG Oral Tablet QTY: 90 tablet Days: 90 Refills: 3  Written: 08/03/22 Patient Instructions: once a day, Disp: , Rfl:     metFORMIN (GLUCOPHAGE) 500 MG tablet, , Disp: , Rfl:     metoprolol succinate (TOPROL-XL) 100 MG 24 hr tablet, Take 1 tablet (100 mg total) by mouth once daily., Disp: 90 tablet, Rfl: 3    morphine (MS CONTIN) 15 MG 12 hr tablet, Take 15 mg by mouth nightly., Disp: , Rfl:     neomycin-polymyxin-dexamethasone (MAXITROL) 3.5mg/mL-10,000 unit/mL-0.1 % DrpS, 1 drop into right EYE FOUR TIMES DAILY FOR 10 DAYS, Disp: , Rfl:     olopatadine (PATANOL) 0.1 % ophthalmic solution, Pataday 0.1% Ophthalmic Solution QTY: 0  Days: 0 Refills: 0  Written: 08/10/22 Patient Instructions:, Disp: , Rfl:     oxyCODONE-acetaminophen (PERCOCET)  mg per tablet, TAKE ONE TABLET BY MOUTH 2 TO 3 times DAILY AS NEEDED FOR PAIN, Disp: , Rfl:     pantoprazole (PROTONIX) 40 MG tablet, , Disp: , Rfl:     repaglinide (PRANDIN) 1 MG tablet, Repaglinide 1 MG Oral Tablet QTY: 60 tablet Days: 30 Refills: 5  Written: 04/11/22 Patient Instructions: twice a day with food, Disp: , Rfl:     RYMED, DEXCHLORPHENIRAMINE-PE, 2-10 mg Tab, Take 1 tablet by mouth 2 (two) times daily., Disp: , Rfl:     spironolactone-hydrochlorothiazide 25-25mg (ALDACTAZIDE) 25-25 mg Tab, Spironolactone-HCTZ 25-25 MG Oral Tablet QTY: 0 tablet Days: 7 Refills: 0  Written: 11/10/22 Patient Instructions:, Disp: , Rfl:     valsartan (DIOVAN) 160 MG tablet, TAKE ONE TABLET BY MOUTH DAILY (STOP losartan), Disp: , Rfl:      Review of Systems   Constitutional:  Negative for activity change, chills, diaphoresis, fatigue and fever.   Respiratory:  Negative for cough and shortness of breath.    Cardiovascular:  Positive for leg swelling. Negative for chest pain and claudication.        Hyperpigmentation LE   Gastrointestinal:  Negative for nausea and vomiting.   Musculoskeletal:  Positive for leg pain. Negative for joint swelling.   Integumentary:   Negative for rash and wound.   Neurological:  Negative for weakness and numbness.          II. PHYSICAL EXAM      Physical Exam  Constitutional:       General: She is awake. She is not in acute distress.     Appearance: Normal appearance. She is obese. She is not ill-appearing or toxic-appearing.   HENT:      Head: Normocephalic and atraumatic.   Eyes:      Extraocular Movements: Extraocular movements intact.      Conjunctiva/sclera: Conjunctivae normal.      Pupils: Pupils are equal, round, and reactive to light.   Neck:      Vascular: No carotid bruit or JVD.   Cardiovascular:      Rate and Rhythm: Normal rate and regular rhythm.      Pulses:           Dorsalis pedis pulses are detected w/ Doppler on the right side and detected w/ Doppler on the left side.        Posterior tibial pulses are detected w/ Doppler on the right side and detected w/ Doppler on the left side.      Heart sounds: No murmur heard.  Pulmonary:      Effort: Pulmonary effort is normal. No respiratory distress.      Breath sounds: No stridor. No wheezing, rhonchi or rales.   Musculoskeletal:         General: No swelling, tenderness or deformity.      Right lower le+ Edema present.      Left lower le+ Edema present.      Comments: No evidence of cellulitis, weeping or open ulceration bilaterally.   Feet:      Comments: Triphasic hand-held dopplerable pulses of the bilateral dorsalis pedis and posterior tibial arteries.  Skin:     General: Skin is warm.      Capillary Refill: Capillary refill takes less than 2 seconds.      Coloration: Skin is not ashen.      Findings: No bruising, erythema, lesion, rash or wound.   Neurological:      Mental Status: She is alert and oriented to person, place, and time.      Motor: No weakness.   Psychiatric:         Speech: Speech normal.         Behavior: Behavior normal. Behavior is cooperative.         Reticular/Spider veins noted:  RLE: none  LLE: none     Varicose veins noted:  RLE: none  LLE:   none     CEAP Classification  Clinical Signs: Class 4 - Skin changes ascribed to venous disease  Etiologic Classification: Primary  Anatomic distribution: Superficial  Pathophysiologic dysfunction: Reflux                             Venous Clinical Severity Score  Pain:2=Daily, moderate activity limitation, occasional analgesics  Varicose Veins: 1=Few, scattered. Branch varicose veins  Venous Edema: 2=Afternoon edema, above ankle  Pigmentation: 1=Diffuse, but limited in area and old (brown)  Inflammation: 0=None  Induration: 0=None  Number of Active Ulcers: 0=0  Active Ulceration, Duration: 0=None  Active Ulcer Size: 0=None  Compressive Therapy: 3=Full compliance, stockings + elevation  Total Score: 9      III. ASSESSMENT & PLAN (MEDICAL DECISION MAKING)      1. Hyperpigmentation of skin    2. Leg pain, bilateral    3. Edema, lower extremity    4. Venous insufficiency          Assessment/Diagnosis and Plan:  The patient continues to have sequela of chronic venous insufficiency despite over 3 months of conservative therapy. The patient continues to have life altering symptoms as well as a positive reflux study as noted in the history of present illness above. At this time I believe it would be reasonable to proceed with a bilateral great saphenous vein endovenous ablations for symptomatic venous insufficiency.  Untreated venous disease increases the patient's risk for cellulitis, deep vein thrombosis, chronic skin changes and venous ulceration.  Risk and benefits of the procedure were explained to the patient and the patient wishes to proceed. The patient does not have overly distended veins and denies history of DVT/PE and will not require prophylactic anticoagulation.      Brandan Babin, DO

## 2023-07-21 NOTE — OP NOTE
Date: 7/21/23  Surgeon: Dr Juan Babin DO    Procedure:  Endovenous radio frequency ablation of the Right great saphenous vein(s) of the lower extremity.    Preprocedure and postprocedure diagnosis: Symptomatic varicose veins and venous insufficiency of the Right leg with other complications to include leg pain, leg edema and chronic skin changes.  The patient has previously failed conservative therapy consider a C4 chronic venous disease with venous hypertension.    Anesthesia: Local infiltration with monitored anesthesia care.  Estimated blood loss: 5 cc.  Specimens removed: none.   Complications: none.  Implants or grafts: none.    Findings:  A total of 9 cycles of radiofrequency ablation was used to treat 52.5 cm of vein over 3 minutes.  Maximum diameter of the vein treated is 5.8 mm with a maximum reflux time of 3.6 seconds.    Procedure detail:  After patient identification, surgical site verification, and marked symptomatic Right leg, the patient was taken to the procedure room and placed in the supine position.  Monitored anesthesia care was induced.  The patient was prepped and draped in the normal sterile fashion leaving the Right leg exposed.  A time-out performed identifying the patient and the correct surgical site.  Tumescent anesthesia was then infiltrated at the site of expected cannulation of the Right great saphenous vein.  The Right great saphenous vein was then cannulated using the modified Seldinger technique to place a 6 Georgian sheath.  I then advanced a 10x60 cm radiofrequency ablation catheter through the sheath up the saphenous vein to approximately 2 cm distal to the epigastric vein.  Tumescence anesthesia was then infiltrated around the vein to be treated.  The patient was placed in the head-down position.  Radiofrequency ablation of the Right great saphenous vein was then performed using a total of 9 cycles of radiofrequency ablation to treat 52.5 cm of vein over 3 minutes.  After this  was done the catheter and sheath were removed and noted to be intact.  Completion ultrasound revealed a widely patent femoral vein, sapheno femoral junction and an ablated great saphenous vein.  The patient's leg was then cleaned and dried.  Sterile dressings and wraps were applied.  The patient was transported to the recovery room in stable condition.

## 2023-07-24 ENCOUNTER — HOSPITAL ENCOUNTER (OUTPATIENT)
Dept: RADIOLOGY | Facility: HOSPITAL | Age: 78
Discharge: HOME | End: 2023-07-24
Attending: FAMILY MEDICINE
Payer: MEDICARE

## 2023-07-24 ENCOUNTER — OFFICE VISIT (OUTPATIENT)
Dept: VASCULAR SURGERY | Facility: CLINIC | Age: 78
End: 2023-07-24
Payer: MEDICARE

## 2023-07-24 VITALS
RESPIRATION RATE: 14 BRPM | DIASTOLIC BLOOD PRESSURE: 70 MMHG | SYSTOLIC BLOOD PRESSURE: 130 MMHG | WEIGHT: 216.63 LBS | BODY MASS INDEX: 39.86 KG/M2 | HEART RATE: 80 BPM | HEIGHT: 62 IN

## 2023-07-24 DIAGNOSIS — M79.605 LEG PAIN, BILATERAL: ICD-10-CM

## 2023-07-24 DIAGNOSIS — M79.604 LEG PAIN, BILATERAL: ICD-10-CM

## 2023-07-24 DIAGNOSIS — R60.0 EDEMA, LOWER EXTREMITY: ICD-10-CM

## 2023-07-24 DIAGNOSIS — I87.2 VENOUS INSUFFICIENCY: ICD-10-CM

## 2023-07-24 DIAGNOSIS — L81.9 HYPERPIGMENTATION OF SKIN: ICD-10-CM

## 2023-07-24 DIAGNOSIS — I87.2 VENOUS INSUFFICIENCY: Primary | ICD-10-CM

## 2023-07-24 PROCEDURE — 1159F PR MEDICATION LIST DOCUMENTED IN MEDICAL RECORD: ICD-10-PCS | Mod: CPTII,,, | Performed by: FAMILY MEDICINE

## 2023-07-24 PROCEDURE — 1126F PR PAIN SEVERITY QUANTIFIED, NO PAIN PRESENT: ICD-10-PCS | Mod: CPTII,,, | Performed by: FAMILY MEDICINE

## 2023-07-24 PROCEDURE — 3288F FALL RISK ASSESSMENT DOCD: CPT | Mod: CPTII,,, | Performed by: FAMILY MEDICINE

## 2023-07-24 PROCEDURE — 93971 US POST ABLATION VENOUS: ICD-10-PCS | Mod: 26,,, | Performed by: FAMILY MEDICINE

## 2023-07-24 PROCEDURE — 99214 PR OFFICE/OUTPT VISIT, EST, LEVL IV, 30-39 MIN: ICD-10-PCS | Mod: S$PBB,,, | Performed by: FAMILY MEDICINE

## 2023-07-24 PROCEDURE — 3078F DIAST BP <80 MM HG: CPT | Mod: CPTII,,, | Performed by: FAMILY MEDICINE

## 2023-07-24 PROCEDURE — 1101F PT FALLS ASSESS-DOCD LE1/YR: CPT | Mod: CPTII,,, | Performed by: FAMILY MEDICINE

## 2023-07-24 PROCEDURE — 99215 OFFICE O/P EST HI 40 MIN: CPT | Mod: PBBFAC,25 | Performed by: FAMILY MEDICINE

## 2023-07-24 PROCEDURE — 3288F PR FALLS RISK ASSESSMENT DOCUMENTED: ICD-10-PCS | Mod: CPTII,,, | Performed by: FAMILY MEDICINE

## 2023-07-24 PROCEDURE — 93971 EXTREMITY STUDY: CPT | Mod: 26,,, | Performed by: FAMILY MEDICINE

## 2023-07-24 PROCEDURE — 3075F SYST BP GE 130 - 139MM HG: CPT | Mod: CPTII,,, | Performed by: FAMILY MEDICINE

## 2023-07-24 PROCEDURE — 93971 EXTREMITY STUDY: CPT | Mod: TC

## 2023-07-24 PROCEDURE — 1160F RVW MEDS BY RX/DR IN RCRD: CPT | Mod: CPTII,,, | Performed by: FAMILY MEDICINE

## 2023-07-24 PROCEDURE — 1101F PR PT FALLS ASSESS DOC 0-1 FALLS W/OUT INJ PAST YR: ICD-10-PCS | Mod: CPTII,,, | Performed by: FAMILY MEDICINE

## 2023-07-24 PROCEDURE — 1159F MED LIST DOCD IN RCRD: CPT | Mod: CPTII,,, | Performed by: FAMILY MEDICINE

## 2023-07-24 PROCEDURE — 1126F AMNT PAIN NOTED NONE PRSNT: CPT | Mod: CPTII,,, | Performed by: FAMILY MEDICINE

## 2023-07-24 PROCEDURE — 3075F PR MOST RECENT SYSTOLIC BLOOD PRESS GE 130-139MM HG: ICD-10-PCS | Mod: CPTII,,, | Performed by: FAMILY MEDICINE

## 2023-07-24 PROCEDURE — 3078F PR MOST RECENT DIASTOLIC BLOOD PRESSURE < 80 MM HG: ICD-10-PCS | Mod: CPTII,,, | Performed by: FAMILY MEDICINE

## 2023-07-24 PROCEDURE — 1160F PR REVIEW ALL MEDS BY PRESCRIBER/CLIN PHARMACIST DOCUMENTED: ICD-10-PCS | Mod: CPTII,,, | Performed by: FAMILY MEDICINE

## 2023-07-24 PROCEDURE — 99214 OFFICE O/P EST MOD 30 MIN: CPT | Mod: S$PBB,,, | Performed by: FAMILY MEDICINE

## 2023-07-24 RX ORDER — CHLORHEXIDINE GLUCONATE ORAL RINSE 1.2 MG/ML
SOLUTION DENTAL
COMMUNITY
Start: 2023-07-13

## 2023-07-24 NOTE — H&P (VIEW-ONLY)
[facility] CLINIC NOTE    Patient ID: Qiuana Bland is a 77 y.o. female.    I. HISTORY     Chief Complaint:   Chief Complaint   Patient presents with    Follow-up     3 days S/P Right GSV RF Ablation        HPI: Quiana Bland is a 77 y.o. female who the patient presents 3 days following a right great saphenous vein radiofrequency ablation.  The patient states that she did well over the weekend without complications.  Has already noticed less swelling, heaviness and tightness.  Post ablation ultrasound shows class 1 heat induced thrombus in the saphenofemoral junction which does not extend into the common femoral vein.  Overall, she feels an improvement in symptoms.      The patient recently presented following 3 months of conservative therapy for chronic venous insufficiency consisting of 15-20 millimeter of mercury compression socks, calf pumping exercises and therapeutic leg elevation.  The patient states that despite these measures she continues to have daily leg pain, swelling, heaviness and tightness.  She feels as though these measures have helped very little and continues to have daily symptoms.  She considers these symptoms to be life altering would like to have the underlying condition corrected if possible    Patient initially seen on 02/28/2023 by referral from Dr Ramírez, cardiology, for evaluation of bilateral lower extremity leg swelling and discomfort.  Symptoms are progressive/worsening and began a proximally 2 years ago.  Location is bilateral lower extremities below the knees. Symptoms are worse at the end of the day.  History of venous interventions includes none.  Unknown family history of venous disease.  Patient also sees Dr. Lozoya, pulmonology, and has a history of obstructive sleep apnea and uses a CPAP nightly.  She also sleeps in a chair secondary to orthopnea.  Also complains of right knee pain and swelling and limited ambulation throughout the day.  Recent negative BNP.   Echocardiogram pending.    Bilateral complete venous reflux study 03/08/2023, shows no evidence of DVT bilaterally.  The study also shows dilation and axial reflux of the bilateral great saphenous veins.    Venous Disease Medical Necessity Documentation Initial Visit Date:  02/28/2023 Return Check Date: 06/08/2023   Have you ever had a rupture or bleed from a varicose vein in your leg(s)?              [] Yes  [x] No   [] Yes   [x] No   Have you ever been diagnosed with phlebitis, cellulitis, or inflammation in the area of the varicose veins of  your leg(s)?  [] Yes  [x] No    [] Yes   [x] No   Do you have darkened or inflamed skin on your legs?   [] Yes   [x] No   [] Yes   [x] No   Do you have leg swelling?     [x] Yes   [] No   [x] Yes   [] No   Do you have leg pain?   [x] Yes   [] No   [x] Yes   [] No   If yes, describe the type of pain?    [x]   Stabbing [x]  Radiating [x]  Aching   [x]  Tightness [x]  Throbbing               [x]  Burning [x]  Cramping          No change    Do you have leg discomfort?   [x] Yes   [] No   [x] Yes   [] No   If yes, describe the type of discomfort?    [x]  Heaviness [x]  Fullness   [x]  Restlessness [x] Tired/Fatigued [x] Itching          No change except now no itching    Have you ever worn compression hose?   [x] Yes   [] No   [x] Yes   [] No   If yes, how long?    18 months    3 months   Do you elevate your legs while sitting?   [x] Yes   [] No   [x] Yes   [] No   Does venous disease (varicose veins, ulcers, skin changes, leg pain/swelling) interfere with your daily life?  If yes, check activities you are limited or unable to do.    [x]  Shower  [x]   Walk  []  Exercise  [x] Play with children/grandchildren  [x]  Shop [] Work [x] Stand for any period of time [x] Sleep                               [x] Sitting for an extended period of time.           [x] Yes   [] No   [x] Yes   [] No   No change   Do you exercise/have you tried to exercise (i.e.  Walk our participate in a regular  exercise routine)?  [] Yes  [x] No   [] Yes   [x] No   BMI 41.8   38.5          Past Medical History:   Diagnosis Date    CHF (congestive heart failure)     DM (diabetes mellitus)     High cholesterol     HTN (hypertension)     Seizures         Past Surgical History:   Procedure Laterality Date    CARDIAC CATHETERIZATION       SECTION      x 1    gall stones removed      HYSTERECTOMY      RADIOFREQUENCY ABLATION Right 2023    Procedure: Right GSV RF Ablation;  Surgeon: Brandan Babin DO;  Location: [facility];  Service: Peripheral Vascular;  Laterality: Right;    TUBAL LIGATION      VAGINAL DELIVERY      x 5       Social History     Tobacco Use   Smoking Status Never   Smokeless Tobacco Never         Current Outpatient Medications:     albuterol 2 mg/5 mL syrup, take 1/2 (one-half) TEASPOONFUL (2.5mls) BY MOUTH THREE TIMES DAILY, Disp: , Rfl:     albuterol-ipratropium (DUO-NEB) 2.5 mg-0.5 mg/3 mL nebulizer solution, Ipratropium-Albuterol 0.5-2.5 (3) MG/3ML Inhalation Solution QTY: 120 mL Days: 30 Refills: 11  Written: 22 Patient Instructions: USE 1 VIAL IN NEBULIZER 4 TIMES DAILY, Disp: , Rfl:     azelastine (ASTELIN) 137 mcg (0.1 %) nasal spray, 1 spray 2 (two) times daily., Disp: , Rfl:     budesonide (PULMICORT) 0.5 mg/2 mL nebulizer solution, Budesonide 0.5 MG/2ML Inhalation Suspension QTY: 120 mL Days: 30 Refills: 11  Written: 22 Patient Instructions: USE 1 VIAL  IN  NEBULIZER TWICE  DAILY - rinse mouth out after each use, Disp: , Rfl:     chlorhexidine (PERIDEX) 0.12 % solution, SMARTSI.5 Ounce(s) By Mouth Twice Daily, Disp: , Rfl:     cyanocobalamin 1,000 mcg/mL injection, Cyanocobalamin 1000 MCG/ML Injection Solution QTY: 1 mL Days: 0 Refills: 0  Written: 22 Patient Instructions: ONCE MONTHLY, Disp: , Rfl:     dapagliflozin (FARXIGA) 10 mg tablet, Take 1 tablet (10 mg total) by mouth once daily., Disp: 90 tablet, Rfl: 3    fluticasone propionate (FLONASE) 50  mcg/actuation nasal spray, Fluticasone Propionate 50 MCG/ACT Nasal Suspension QTY: 1 gram Days: 30 Refills: 3  Written: 01/11/22 Patient Instructions: twice a day 1 spray each nostril, Disp: , Rfl:     glimepiride (AMARYL) 4 MG tablet, , Disp: , Rfl:     isosorbide mononitrate (IMDUR) 30 MG 24 hr tablet, Take 1 tablet (30 mg total) by mouth once daily., Disp: 90 tablet, Rfl: 3    levalbuterol (XOPENEX HFA) 45 mcg/actuation inhaler, Xopenex HFA 45 MCG/ACT Inhalation Aerosol QTY: 0  Days: 0 Refills: 0  Written: 07/07/22 Patient Instructions:, Disp: , Rfl:     LINZESS 145 mcg Cap capsule, TAKE ONE CAPSULE BY MOUTH ONCE DAILY FOR constipation, Disp: , Rfl:     losartan (COZAAR) 100 MG tablet, Losartan Potassium 100 MG Oral Tablet QTY: 90 tablet Days: 90 Refills: 3  Written: 08/03/22 Patient Instructions: once a day, Disp: , Rfl:     metFORMIN (GLUCOPHAGE) 500 MG tablet, , Disp: , Rfl:     metoprolol succinate (TOPROL-XL) 100 MG 24 hr tablet, Take 1 tablet (100 mg total) by mouth once daily., Disp: 90 tablet, Rfl: 3    morphine (MS CONTIN) 15 MG 12 hr tablet, Take 15 mg by mouth nightly., Disp: , Rfl:     neomycin-polymyxin-dexamethasone (MAXITROL) 3.5mg/mL-10,000 unit/mL-0.1 % DrpS, 1 drop into right EYE FOUR TIMES DAILY FOR 10 DAYS, Disp: , Rfl:     olopatadine (PATANOL) 0.1 % ophthalmic solution, Pataday 0.1% Ophthalmic Solution QTY: 0  Days: 0 Refills: 0  Written: 08/10/22 Patient Instructions:, Disp: , Rfl:     oxyCODONE-acetaminophen (PERCOCET)  mg per tablet, TAKE ONE TABLET BY MOUTH 2 TO 3 times DAILY AS NEEDED FOR PAIN, Disp: , Rfl:     pantoprazole (PROTONIX) 40 MG tablet, , Disp: , Rfl:     repaglinide (PRANDIN) 1 MG tablet, Repaglinide 1 MG Oral Tablet QTY: 60 tablet Days: 30 Refills: 5  Written: 04/11/22 Patient Instructions: twice a day with food, Disp: , Rfl:     RYMED, DEXCHLORPHENIRAMINE-PE, 2-10 mg Tab, Take 1 tablet by mouth 2 (two) times daily., Disp: , Rfl:      spironolactone-hydrochlorothiazide 25-25mg (ALDACTAZIDE) 25-25 mg Tab, Spironolactone-HCTZ 25-25 MG Oral Tablet QTY: 0 tablet Days: 7 Refills: 0  Written: 11/10/22 Patient Instructions:, Disp: , Rfl:     valsartan (DIOVAN) 160 MG tablet, TAKE ONE TABLET BY MOUTH DAILY (STOP losartan), Disp: , Rfl:     Review of Systems   Constitutional:  Negative for activity change, chills, diaphoresis, fatigue and fever.   Respiratory:  Negative for cough and shortness of breath.    Cardiovascular:  Positive for leg swelling. Negative for chest pain and claudication.        Hyperpigmentation LE   Gastrointestinal:  Negative for nausea and vomiting.   Musculoskeletal:  Positive for leg pain. Negative for joint swelling.   Integumentary:  Negative for rash and wound.   Neurological:  Negative for weakness and numbness.        II. PHYSICAL EXAM     Physical Exam  Constitutional:       General: She is awake. She is not in acute distress.     Appearance: Normal appearance. She is obese. She is not ill-appearing or toxic-appearing.   HENT:      Head: Normocephalic and atraumatic.   Eyes:      Extraocular Movements: Extraocular movements intact.      Conjunctiva/sclera: Conjunctivae normal.      Pupils: Pupils are equal, round, and reactive to light.   Neck:      Vascular: No carotid bruit or JVD.   Cardiovascular:      Rate and Rhythm: Normal rate and regular rhythm.      Pulses:           Dorsalis pedis pulses are detected w/ Doppler on the right side and detected w/ Doppler on the left side.        Posterior tibial pulses are detected w/ Doppler on the right side and detected w/ Doppler on the left side.      Heart sounds: No murmur heard.  Pulmonary:      Effort: Pulmonary effort is normal. No respiratory distress.      Breath sounds: No stridor. No wheezing, rhonchi or rales.   Musculoskeletal:         General: No swelling, tenderness or deformity.      Right lower le+ Edema present.      Left lower le+ Edema present.       Comments: No evidence of cellulitis, weeping or open ulceration bilaterally.   Feet:      Comments: Triphasic hand-held dopplerable pulses of the bilateral dorsalis pedis and posterior tibial arteries.  Skin:     General: Skin is warm.      Capillary Refill: Capillary refill takes less than 2 seconds.      Coloration: Skin is not ashen.      Findings: No bruising, erythema, lesion, rash or wound.   Neurological:      Mental Status: She is alert and oriented to person, place, and time.      Motor: No weakness.   Psychiatric:         Speech: Speech normal.         Behavior: Behavior normal. Behavior is cooperative.       Reticular/Spider veins noted:  RLE: none  LLE: none    Varicose veins noted:  RLE: none  LLE:  none    CEAP Classification  Clinical Signs: Class 4 - Skin changes ascribed to venous disease  Etiologic Classification: Primary  Anatomic distribution: Superficial  Pathophysiologic dysfunction: Reflux                       Venous Clinical Severity Score  Pain:2=Daily, moderate activity limitation, occasional analgesics  Varicose Veins: 1=Few, scattered. Branch varicose veins  Venous Edema: 2=Afternoon edema, above ankle  Pigmentation: 1=Diffuse, but limited in area and old (brown)  Inflammation: 0=None  Induration: 0=None  Number of Active Ulcers: 0=0  Active Ulceration, Duration: 0=None  Active Ulcer Size: 0=None  Compressive Therapy: 3=Full compliance, stockings + elevation  Total Score: 9     III. ASSESSMENT & PLAN (MEDICAL DECISION MAKING)     1. Venous insufficiency    2. Edema, lower extremity    3. Hyperpigmentation of skin    4. Leg pain, bilateral      Assessment/Diagnosis and Plan:  The patient continues to have sequela of chronic venous insufficiency despite over 3 months of conservative therapy. The patient continues to have life altering symptoms as well as a positive reflux study as noted in the history of present illness above. At this time I believe it would be reasonable to proceed with a  left great saphenous vein endovenous ablation for symptomatic venous insufficiency.  Untreated venous disease increases the patient's risk for cellulitis, deep vein thrombosis, chronic skin changes and venous ulceration.  Risk and benefits of the procedure were explained to the patient and the patient wishes to proceed. The patient does not have overly distended veins and denies history of DVT/PE and will not require prophylactic anticoagulation.     Anticoagulation not indicated with class 1 EHIT. Continue postoperative compression over the next 2 weeks.  Encouraged to continue with leg exercise, leg elevation and compression daily.     Brandan Babin, DO

## 2023-07-24 NOTE — PROGRESS NOTES
VEIN CENTER CLINIC NOTE    Patient ID: Quiana Bland is a 77 y.o. female.    I. HISTORY     Chief Complaint:   Chief Complaint   Patient presents with    Follow-up     3 days S/P Right GSV RF Ablation        HPI: Quiana Bland is a 77 y.o. female who the patient presents 3 days following a right great saphenous vein radiofrequency ablation.  The patient states that she did well over the weekend without complications.  Has already noticed less swelling, heaviness and tightness.  Post ablation ultrasound shows class 1 heat induced thrombus in the saphenofemoral junction which does not extend into the common femoral vein.  Overall, she feels an improvement in symptoms.      The patient recently presented following 3 months of conservative therapy for chronic venous insufficiency consisting of 15-20 millimeter of mercury compression socks, calf pumping exercises and therapeutic leg elevation.  The patient states that despite these measures she continues to have daily leg pain, swelling, heaviness and tightness.  She feels as though these measures have helped very little and continues to have daily symptoms.  She considers these symptoms to be life altering would like to have the underlying condition corrected if possible    Patient initially seen on 02/28/2023 by referral from Dr Ramírez, cardiology, for evaluation of bilateral lower extremity leg swelling and discomfort.  Symptoms are progressive/worsening and began a proximally 2 years ago.  Location is bilateral lower extremities below the knees. Symptoms are worse at the end of the day.  History of venous interventions includes none.  Unknown family history of venous disease.  Patient also sees Dr. Lozoya, pulmonology, and has a history of obstructive sleep apnea and uses a CPAP nightly.  She also sleeps in a chair secondary to orthopnea.  Also complains of right knee pain and swelling and limited ambulation throughout the day.  Recent negative BNP.   Echocardiogram pending.    Bilateral complete venous reflux study 03/08/2023, shows no evidence of DVT bilaterally.  The study also shows dilation and axial reflux of the bilateral great saphenous veins.    Venous Disease Medical Necessity Documentation Initial Visit Date:  02/28/2023 Return Check Date: 06/08/2023   Have you ever had a rupture or bleed from a varicose vein in your leg(s)?              [] Yes  [x] No   [] Yes   [x] No   Have you ever been diagnosed with phlebitis, cellulitis, or inflammation in the area of the varicose veins of  your leg(s)?  [] Yes  [x] No    [] Yes   [x] No   Do you have darkened or inflamed skin on your legs?   [] Yes   [x] No   [] Yes   [x] No   Do you have leg swelling?     [x] Yes   [] No   [x] Yes   [] No   Do you have leg pain?   [x] Yes   [] No   [x] Yes   [] No   If yes, describe the type of pain?    [x]   Stabbing [x]  Radiating [x]  Aching   [x]  Tightness [x]  Throbbing               [x]  Burning [x]  Cramping          No change    Do you have leg discomfort?   [x] Yes   [] No   [x] Yes   [] No   If yes, describe the type of discomfort?    [x]  Heaviness [x]  Fullness   [x]  Restlessness [x] Tired/Fatigued [x] Itching          No change except now no itching    Have you ever worn compression hose?   [x] Yes   [] No   [x] Yes   [] No   If yes, how long?    18 months    3 months   Do you elevate your legs while sitting?   [x] Yes   [] No   [x] Yes   [] No   Does venous disease (varicose veins, ulcers, skin changes, leg pain/swelling) interfere with your daily life?  If yes, check activities you are limited or unable to do.    [x]  Shower  [x]   Walk  []  Exercise  [x] Play with children/grandchildren  [x]  Shop [] Work [x] Stand for any period of time [x] Sleep                               [x] Sitting for an extended period of time.           [x] Yes   [] No   [x] Yes   [] No   No change   Do you exercise/have you tried to exercise (i.e.  Walk our participate in a regular  exercise routine)?  [] Yes  [x] No   [] Yes   [x] No   BMI 41.8   38.5          Past Medical History:   Diagnosis Date    CHF (congestive heart failure)     DM (diabetes mellitus)     High cholesterol     HTN (hypertension)     Seizures         Past Surgical History:   Procedure Laterality Date    CARDIAC CATHETERIZATION       SECTION      x 1    gall stones removed      HYSTERECTOMY      RADIOFREQUENCY ABLATION Right 2023    Procedure: Right GSV RF Ablation;  Surgeon: Brandan Babin DO;  Location: [facility];  Service: Peripheral Vascular;  Laterality: Right;    TUBAL LIGATION      VAGINAL DELIVERY      x 5       Social History     Tobacco Use   Smoking Status Never   Smokeless Tobacco Never         Current Outpatient Medications:     albuterol 2 mg/5 mL syrup, take 1/2 (one-half) TEASPOONFUL (2.5mls) BY MOUTH THREE TIMES DAILY, Disp: , Rfl:     albuterol-ipratropium (DUO-NEB) 2.5 mg-0.5 mg/3 mL nebulizer solution, Ipratropium-Albuterol 0.5-2.5 (3) MG/3ML Inhalation Solution QTY: 120 mL Days: 30 Refills: 11  Written: 22 Patient Instructions: USE 1 VIAL IN NEBULIZER 4 TIMES DAILY, Disp: , Rfl:     azelastine (ASTELIN) 137 mcg (0.1 %) nasal spray, 1 spray 2 (two) times daily., Disp: , Rfl:     budesonide (PULMICORT) 0.5 mg/2 mL nebulizer solution, Budesonide 0.5 MG/2ML Inhalation Suspension QTY: 120 mL Days: 30 Refills: 11  Written: 22 Patient Instructions: USE 1 VIAL  IN  NEBULIZER TWICE  DAILY - rinse mouth out after each use, Disp: , Rfl:     chlorhexidine (PERIDEX) 0.12 % solution, SMARTSI.5 Ounce(s) By Mouth Twice Daily, Disp: , Rfl:     cyanocobalamin 1,000 mcg/mL injection, Cyanocobalamin 1000 MCG/ML Injection Solution QTY: 1 mL Days: 0 Refills: 0  Written: 22 Patient Instructions: ONCE MONTHLY, Disp: , Rfl:     dapagliflozin (FARXIGA) 10 mg tablet, Take 1 tablet (10 mg total) by mouth once daily., Disp: 90 tablet, Rfl: 3    fluticasone propionate (FLONASE) 50  mcg/actuation nasal spray, Fluticasone Propionate 50 MCG/ACT Nasal Suspension QTY: 1 gram Days: 30 Refills: 3  Written: 01/11/22 Patient Instructions: twice a day 1 spray each nostril, Disp: , Rfl:     glimepiride (AMARYL) 4 MG tablet, , Disp: , Rfl:     isosorbide mononitrate (IMDUR) 30 MG 24 hr tablet, Take 1 tablet (30 mg total) by mouth once daily., Disp: 90 tablet, Rfl: 3    levalbuterol (XOPENEX HFA) 45 mcg/actuation inhaler, Xopenex HFA 45 MCG/ACT Inhalation Aerosol QTY: 0  Days: 0 Refills: 0  Written: 07/07/22 Patient Instructions:, Disp: , Rfl:     LINZESS 145 mcg Cap capsule, TAKE ONE CAPSULE BY MOUTH ONCE DAILY FOR constipation, Disp: , Rfl:     losartan (COZAAR) 100 MG tablet, Losartan Potassium 100 MG Oral Tablet QTY: 90 tablet Days: 90 Refills: 3  Written: 08/03/22 Patient Instructions: once a day, Disp: , Rfl:     metFORMIN (GLUCOPHAGE) 500 MG tablet, , Disp: , Rfl:     metoprolol succinate (TOPROL-XL) 100 MG 24 hr tablet, Take 1 tablet (100 mg total) by mouth once daily., Disp: 90 tablet, Rfl: 3    morphine (MS CONTIN) 15 MG 12 hr tablet, Take 15 mg by mouth nightly., Disp: , Rfl:     neomycin-polymyxin-dexamethasone (MAXITROL) 3.5mg/mL-10,000 unit/mL-0.1 % DrpS, 1 drop into right EYE FOUR TIMES DAILY FOR 10 DAYS, Disp: , Rfl:     olopatadine (PATANOL) 0.1 % ophthalmic solution, Pataday 0.1% Ophthalmic Solution QTY: 0  Days: 0 Refills: 0  Written: 08/10/22 Patient Instructions:, Disp: , Rfl:     oxyCODONE-acetaminophen (PERCOCET)  mg per tablet, TAKE ONE TABLET BY MOUTH 2 TO 3 times DAILY AS NEEDED FOR PAIN, Disp: , Rfl:     pantoprazole (PROTONIX) 40 MG tablet, , Disp: , Rfl:     repaglinide (PRANDIN) 1 MG tablet, Repaglinide 1 MG Oral Tablet QTY: 60 tablet Days: 30 Refills: 5  Written: 04/11/22 Patient Instructions: twice a day with food, Disp: , Rfl:     RYMED, DEXCHLORPHENIRAMINE-PE, 2-10 mg Tab, Take 1 tablet by mouth 2 (two) times daily., Disp: , Rfl:      spironolactone-hydrochlorothiazide 25-25mg (ALDACTAZIDE) 25-25 mg Tab, Spironolactone-HCTZ 25-25 MG Oral Tablet QTY: 0 tablet Days: 7 Refills: 0  Written: 11/10/22 Patient Instructions:, Disp: , Rfl:     valsartan (DIOVAN) 160 MG tablet, TAKE ONE TABLET BY MOUTH DAILY (STOP losartan), Disp: , Rfl:     Review of Systems   Constitutional:  Negative for activity change, chills, diaphoresis, fatigue and fever.   Respiratory:  Negative for cough and shortness of breath.    Cardiovascular:  Positive for leg swelling. Negative for chest pain and claudication.        Hyperpigmentation LE   Gastrointestinal:  Negative for nausea and vomiting.   Musculoskeletal:  Positive for leg pain. Negative for joint swelling.   Integumentary:  Negative for rash and wound.   Neurological:  Negative for weakness and numbness.        II. PHYSICAL EXAM     Physical Exam  Constitutional:       General: She is awake. She is not in acute distress.     Appearance: Normal appearance. She is obese. She is not ill-appearing or toxic-appearing.   HENT:      Head: Normocephalic and atraumatic.   Eyes:      Extraocular Movements: Extraocular movements intact.      Conjunctiva/sclera: Conjunctivae normal.      Pupils: Pupils are equal, round, and reactive to light.   Neck:      Vascular: No carotid bruit or JVD.   Cardiovascular:      Rate and Rhythm: Normal rate and regular rhythm.      Pulses:           Dorsalis pedis pulses are detected w/ Doppler on the right side and detected w/ Doppler on the left side.        Posterior tibial pulses are detected w/ Doppler on the right side and detected w/ Doppler on the left side.      Heart sounds: No murmur heard.  Pulmonary:      Effort: Pulmonary effort is normal. No respiratory distress.      Breath sounds: No stridor. No wheezing, rhonchi or rales.   Musculoskeletal:         General: No swelling, tenderness or deformity.      Right lower le+ Edema present.      Left lower le+ Edema present.       Comments: No evidence of cellulitis, weeping or open ulceration bilaterally.   Feet:      Comments: Triphasic hand-held dopplerable pulses of the bilateral dorsalis pedis and posterior tibial arteries.  Skin:     General: Skin is warm.      Capillary Refill: Capillary refill takes less than 2 seconds.      Coloration: Skin is not ashen.      Findings: No bruising, erythema, lesion, rash or wound.   Neurological:      Mental Status: She is alert and oriented to person, place, and time.      Motor: No weakness.   Psychiatric:         Speech: Speech normal.         Behavior: Behavior normal. Behavior is cooperative.       Reticular/Spider veins noted:  RLE: none  LLE: none    Varicose veins noted:  RLE: none  LLE:  none    CEAP Classification  Clinical Signs: Class 4 - Skin changes ascribed to venous disease  Etiologic Classification: Primary  Anatomic distribution: Superficial  Pathophysiologic dysfunction: Reflux                       Venous Clinical Severity Score  Pain:2=Daily, moderate activity limitation, occasional analgesics  Varicose Veins: 1=Few, scattered. Branch varicose veins  Venous Edema: 2=Afternoon edema, above ankle  Pigmentation: 1=Diffuse, but limited in area and old (brown)  Inflammation: 0=None  Induration: 0=None  Number of Active Ulcers: 0=0  Active Ulceration, Duration: 0=None  Active Ulcer Size: 0=None  Compressive Therapy: 3=Full compliance, stockings + elevation  Total Score: 9     III. ASSESSMENT & PLAN (MEDICAL DECISION MAKING)     1. Venous insufficiency    2. Edema, lower extremity    3. Hyperpigmentation of skin    4. Leg pain, bilateral      Assessment/Diagnosis and Plan:  The patient continues to have sequela of chronic venous insufficiency despite over 3 months of conservative therapy. The patient continues to have life altering symptoms as well as a positive reflux study as noted in the history of present illness above. At this time I believe it would be reasonable to proceed with a  left great saphenous vein endovenous ablation for symptomatic venous insufficiency.  Untreated venous disease increases the patient's risk for cellulitis, deep vein thrombosis, chronic skin changes and venous ulceration.  Risk and benefits of the procedure were explained to the patient and the patient wishes to proceed. The patient does not have overly distended veins and denies history of DVT/PE and will not require prophylactic anticoagulation.     Anticoagulation not indicated with class 1 EHIT. Continue postoperative compression over the next 2 weeks.  Encouraged to continue with leg exercise, leg elevation and compression daily.     Brandan Babin, DO

## 2023-07-28 ENCOUNTER — ANESTHESIA (OUTPATIENT)
Dept: SURGERY | Facility: HOSPITAL | Age: 78
End: 2023-07-28
Payer: MEDICARE

## 2023-07-28 ENCOUNTER — HOSPITAL ENCOUNTER (OUTPATIENT)
Facility: HOSPITAL | Age: 78
Discharge: HOME | End: 2023-07-28
Attending: FAMILY MEDICINE | Admitting: FAMILY MEDICINE
Payer: MEDICARE

## 2023-07-28 ENCOUNTER — ANESTHESIA EVENT (OUTPATIENT)
Dept: SURGERY | Facility: HOSPITAL | Age: 78
End: 2023-07-28
Payer: MEDICARE

## 2023-07-28 VITALS
WEIGHT: 206 LBS | HEIGHT: 63 IN | HEART RATE: 62 BPM | BODY MASS INDEX: 36.5 KG/M2 | SYSTOLIC BLOOD PRESSURE: 161 MMHG | TEMPERATURE: 97 F | OXYGEN SATURATION: 95 % | RESPIRATION RATE: 16 BRPM | DIASTOLIC BLOOD PRESSURE: 74 MMHG

## 2023-07-28 DIAGNOSIS — I87.2 VENOUS INSUFFICIENCY (CHRONIC) (PERIPHERAL): Primary | ICD-10-CM

## 2023-07-28 DIAGNOSIS — I87.2 VENOUS INSUFFICIENCY: Primary | ICD-10-CM

## 2023-07-28 LAB
GLUCOSE SERPL-MCNC: 110 MG/DL (ref 70–105)
GLUCOSE SERPL-MCNC: 134 MG/DL (ref 70–105)

## 2023-07-28 PROCEDURE — 37000009 HC ANESTHESIA EA ADD 15 MINS: Performed by: FAMILY MEDICINE

## 2023-07-28 PROCEDURE — 63600175 PHARM REV CODE 636 W HCPCS: Performed by: ANESTHESIOLOGY

## 2023-07-28 PROCEDURE — 36475 ENDOVENOUS RF 1ST VEIN: CPT | Mod: LT,,, | Performed by: FAMILY MEDICINE

## 2023-07-28 PROCEDURE — D9220A PRA ANESTHESIA: ICD-10-PCS | Mod: ANES,,, | Performed by: ANESTHESIOLOGY

## 2023-07-28 PROCEDURE — 36000705 HC OR TIME LEV I EA ADD 15 MIN: Performed by: FAMILY MEDICINE

## 2023-07-28 PROCEDURE — D9220A PRA ANESTHESIA: Mod: CRNA,,, | Performed by: ANESTHESIOLOGY

## 2023-07-28 PROCEDURE — D9220A PRA ANESTHESIA: ICD-10-PCS | Mod: CRNA,,, | Performed by: ANESTHESIOLOGY

## 2023-07-28 PROCEDURE — 36000704 HC OR TIME LEV I 1ST 15 MIN: Performed by: FAMILY MEDICINE

## 2023-07-28 PROCEDURE — 25000003 PHARM REV CODE 250: Performed by: FAMILY MEDICINE

## 2023-07-28 PROCEDURE — C1894 INTRO/SHEATH, NON-LASER: HCPCS | Performed by: FAMILY MEDICINE

## 2023-07-28 PROCEDURE — 36475 PR ENDOVENOUS RF, 1ST VEIN: ICD-10-PCS | Mod: LT,,, | Performed by: FAMILY MEDICINE

## 2023-07-28 PROCEDURE — 25000003 PHARM REV CODE 250: Performed by: ANESTHESIOLOGY

## 2023-07-28 PROCEDURE — 71000015 HC POSTOP RECOV 1ST HR: Performed by: FAMILY MEDICINE

## 2023-07-28 PROCEDURE — 71000033 HC RECOVERY, INTIAL HOUR: Performed by: FAMILY MEDICINE

## 2023-07-28 PROCEDURE — 37000008 HC ANESTHESIA 1ST 15 MINUTES: Performed by: FAMILY MEDICINE

## 2023-07-28 PROCEDURE — D9220A PRA ANESTHESIA: Mod: ANES,,, | Performed by: ANESTHESIOLOGY

## 2023-07-28 PROCEDURE — 82962 GLUCOSE BLOOD TEST: CPT

## 2023-07-28 RX ORDER — SODIUM CHLORIDE 9 MG/ML
INJECTION, SOLUTION INTRAVENOUS CONTINUOUS
Status: DISCONTINUED | OUTPATIENT
Start: 2023-07-28 | End: 2023-07-28 | Stop reason: HOSPADM

## 2023-07-28 RX ORDER — DIPHENHYDRAMINE HYDROCHLORIDE 50 MG/ML
25 INJECTION INTRAMUSCULAR; INTRAVENOUS EVERY 6 HOURS PRN
Status: DISCONTINUED | OUTPATIENT
Start: 2023-07-28 | End: 2023-07-28 | Stop reason: HOSPADM

## 2023-07-28 RX ORDER — ONDANSETRON 2 MG/ML
4 INJECTION INTRAMUSCULAR; INTRAVENOUS DAILY PRN
Status: DISCONTINUED | OUTPATIENT
Start: 2023-07-28 | End: 2023-07-28 | Stop reason: HOSPADM

## 2023-07-28 RX ORDER — MIDAZOLAM HYDROCHLORIDE 1 MG/ML
INJECTION INTRAMUSCULAR; INTRAVENOUS
Status: DISCONTINUED | OUTPATIENT
Start: 2023-07-28 | End: 2023-07-28

## 2023-07-28 RX ORDER — OXYCODONE HYDROCHLORIDE 5 MG/1
5 TABLET ORAL
Status: DISCONTINUED | OUTPATIENT
Start: 2023-07-28 | End: 2023-07-28 | Stop reason: HOSPADM

## 2023-07-28 RX ORDER — HYDROMORPHONE HYDROCHLORIDE 2 MG/ML
0.5 INJECTION, SOLUTION INTRAMUSCULAR; INTRAVENOUS; SUBCUTANEOUS EVERY 5 MIN PRN
Status: DISCONTINUED | OUTPATIENT
Start: 2023-07-28 | End: 2023-07-28 | Stop reason: HOSPADM

## 2023-07-28 RX ORDER — PROPOFOL 10 MG/ML
VIAL (ML) INTRAVENOUS
Status: DISCONTINUED | OUTPATIENT
Start: 2023-07-28 | End: 2023-07-28

## 2023-07-28 RX ORDER — LIDOCAINE HYDROCHLORIDE 20 MG/ML
INJECTION, SOLUTION EPIDURAL; INFILTRATION; INTRACAUDAL; PERINEURAL
Status: DISCONTINUED | OUTPATIENT
Start: 2023-07-28 | End: 2023-07-28

## 2023-07-28 RX ORDER — MORPHINE SULFATE 10 MG/ML
4 INJECTION INTRAMUSCULAR; INTRAVENOUS; SUBCUTANEOUS EVERY 5 MIN PRN
Status: DISCONTINUED | OUTPATIENT
Start: 2023-07-28 | End: 2023-07-28 | Stop reason: HOSPADM

## 2023-07-28 RX ORDER — MEPERIDINE HYDROCHLORIDE 25 MG/ML
25 INJECTION INTRAMUSCULAR; INTRAVENOUS; SUBCUTANEOUS EVERY 10 MIN PRN
Status: DISCONTINUED | OUTPATIENT
Start: 2023-07-28 | End: 2023-07-28 | Stop reason: HOSPADM

## 2023-07-28 RX ORDER — SODIUM CHLORIDE, SODIUM LACTATE, POTASSIUM CHLORIDE, CALCIUM CHLORIDE 600; 310; 30; 20 MG/100ML; MG/100ML; MG/100ML; MG/100ML
125 INJECTION, SOLUTION INTRAVENOUS CONTINUOUS
Status: DISCONTINUED | OUTPATIENT
Start: 2023-07-28 | End: 2023-07-28 | Stop reason: HOSPADM

## 2023-07-28 RX ADMIN — PROPOFOL 50 MG: 10 INJECTION, EMULSION INTRAVENOUS at 10:07

## 2023-07-28 RX ADMIN — SODIUM CHLORIDE: 9 INJECTION, SOLUTION INTRAVENOUS at 09:07

## 2023-07-28 RX ADMIN — MIDAZOLAM 1 MG: 1 INJECTION INTRAMUSCULAR; INTRAVENOUS at 10:07

## 2023-07-28 RX ADMIN — LIDOCAINE HYDROCHLORIDE 40 MG: 20 INJECTION, SOLUTION INTRAVENOUS at 10:07

## 2023-07-28 NOTE — DISCHARGE SUMMARY
[facility] - Periop Services  Discharge Note  Short Stay    Procedure(s) (LRB):  Left RF Ablation (Left)      OUTCOME: Patient tolerated treatment/procedure well without complication and is now ready for discharge.    DISPOSITION: Home or Self Care    FINAL DIAGNOSIS:  Venous insufficiency    FOLLOWUP: In clinic    DISCHARGE INSTRUCTIONS:  No discharge procedures on file.     TIME SPENT ON DISCHARGE: 5 minutes

## 2023-07-28 NOTE — TRANSFER OF CARE
"Anesthesia Transfer of Care Note    Patient: Quiana Bland    Procedure(s) Performed: Procedure(s) (LRB):  Left RF Ablation (Left)    Patient location: PACU    Anesthesia Type: general    Transport from OR: Transported from OR on room air with adequate spontaneous ventilation    Post pain: adequate analgesia    Post assessment: no apparent anesthetic complications    Post vital signs: stable    Level of consciousness: awake and responds to stimulation    Nausea/Vomiting: no nausea/vomiting    Complications: none    Transfer of care protocol was followed      Last vitals:   Visit Vitals  BP (!) 161/73 (BP Location: Left arm, Patient Position: Lying)   Pulse 60   Temp 36.1 °C (97 °F) (Skin)   Resp 20   Ht 5' 3" (1.6 m)   Wt 93.4 kg (206 lb)   SpO2 100%   Breastfeeding No   BMI 36.49 kg/m²     "

## 2023-07-28 NOTE — ANESTHESIA POSTPROCEDURE EVALUATION
Anesthesia Post Evaluation    Patient: Quiana Bland    Procedure(s) Performed: Procedure(s) (LRB):  Left RF Ablation (Left)    Final Anesthesia Type: general      Patient location during evaluation: PACU  Patient participation: Yes- Able to Participate  Level of consciousness: awake and sedated  Post-procedure vital signs: reviewed and stable  Pain management: adequate  Airway patency: patent    PONV status at discharge: No PONV  Anesthetic complications: no      Cardiovascular status: blood pressure returned to baseline  Respiratory status: unassisted  Hydration status: euvolemic  Follow-up not needed.          Vitals Value Taken Time   /74 07/28/23 1145   Temp 36.1 °C (97 °F) 07/28/23 1114   Pulse 62 07/28/23 1153   Resp 16 07/28/23 1145   SpO2 98 % 07/28/23 1153   Vitals shown include unvalidated device data.      Event Time   Out of Recovery 11:38:00         Pain/Sergo Score: Sergo Score: 10 (7/28/2023 12:15 PM)

## 2023-07-28 NOTE — PROGRESS NOTES
1140 RELEASED TO [facility] RN AWAKE, ALERT. NO DISTRESS NOTED. V/S 161/74-62-16-95%. FAMILY AT BEDSIDE.

## 2023-07-28 NOTE — OP NOTE
Date: 7/28/23  Surgeon: Dr Juan Babin DO    Procedure:  Endovenous radio frequency ablation of the Left great saphenous vein(s) of the lower extremity.    Preprocedure and postprocedure diagnosis: Symptomatic varicose veins and venous insufficiency of the Left leg and other complications to include leg pain, leg edema and chronic skin changes.  The patient has previously failed conservative therapy consider a C4 chronic venous disease with venous hypertension.    Anesthesia: Local infiltration with monitored anesthesia care.  Estimated blood loss: 3 cc.  Specimens removed: none.   Complications: none.  Implants or grafts: none.    Findings:  A total of 7 cycles of radiofrequency ablation was used to treat 35 cm of vein over 2 minutes 20 seconds.  Maximum diameter of the vein treated is 6.8 mm with a maximum reflux time of 3.8 seconds.    Procedure detail:  After patient identification, surgical site verification, and marked symptomatic Left leg, the patient was taken to the procedure room and placed in the supine position.  Monitored anesthesia care was induced.  The patient was prepped and draped in the normal sterile fashion leaving the Left leg exposed.  A time-out performed identifying the patient and the correct surgical site.  Tumescent anesthesia was then infiltrated at the site of expected cannulation of the Left great saphenous vein.  The Left great saphenous vein was then cannulated using the modified Seldinger technique to place a 6 [place] sheath.  I then advanced a 10x60 cm radiofrequency ablation catheter through the sheath up the saphenous vein to approximately 2 cm distal to the epigastric vein.  Tumescence anesthesia was then infiltrated around the vein to be treated.  The patient was placed in the head-down position.  Radiofrequency ablation of the Left great saphenous vein was then performed using a total of 7 cycles of radiofrequency ablation to treat 35 cm of vein over 2 minutes 20 seconds.   After this was done the catheter and sheath were removed and noted to be intact.  Completion ultrasound revealed a widely patent femoral vein, sapheno femoral junction and an ablated great saphenous vein.  The patient's leg was then cleaned and dried.  Sterile dressings and wraps were applied.  The patient was transported to the recovery room in stable condition.

## 2023-07-28 NOTE — ANESTHESIA PREPROCEDURE EVALUATION
07/28/2023  Quiana Bland is a 78 y.o., female.      Pre-op Assessment    I have reviewed the Patient Summary Reports.     I have reviewed the Nursing Notes. I have reviewed the NPO Status.   I have reviewed the Medications.     Review of Systems  Anesthesia Hx:  No problems with previous Anesthesia  Denies Family Hx of Anesthesia complications.   Denies Personal Hx of Anesthesia complications.   Social:  No Alcohol Use, Non-Smoker Advanced age   Hematology/Oncology:  Hematology Normal   Oncology Normal     EENT/Dental:EENT/Dental Normal   Cardiovascular:  Cardiovascular Normal Hypertension  CHF PVD hyperlipidemia    Pulmonary:  Pulmonary Normal    Renal/:  Renal/ Normal     Hepatic/GI:  Hepatic/GI Normal    Musculoskeletal:  Musculoskeletal Normal    Neurological:   CVA, no residual symptoms Seizures    Endocrine:   Diabetes, type 2  Morbid Obesity / BMI > 40  Dermatological:  Skin Normal    Psych:  Psychiatric Normal           Physical Exam  General: Well nourished, Cooperative and Alert    Airway:  Mallampati: III / III  Mouth Opening: Normal  TM Distance: Normal  Tongue: Normal  Neck ROM: Normal ROM    Chest/Lungs:  Clear to auscultation, Normal Respiratory Rate    Heart:  Rate: Normal  Rhythm: Regular Rhythm        Chemistry        Component Value Date/Time     02/16/2023 1154    K 4.2 02/16/2023 1154     02/16/2023 1154    CO2 28 02/16/2023 1154    BUN 17 02/16/2023 1154    CREATININE 1.00 02/16/2023 1154     (H) 02/16/2023 1154        Component Value Date/Time    CALCIUM 10.0 02/16/2023 1154        Lab Results   Component Value Date    WBC 15.42 (H) 02/16/2023    HGB 12.1 02/16/2023    HCT 39.2 02/16/2023     (H) 02/16/2023     Results for orders placed or performed in visit on 02/16/23   EKG 12-lead    Collection Time: 02/16/23 10:07 AM    Narrative    Test Reason :  I10,    Vent. Rate : 086 BPM     Atrial Rate : 086 BPM     P-R Int : 146 ms          QRS Dur : 080 ms      QT Int : 368 ms       P-R-T Axes : 060 003 -78 degrees     QTc Int : 440 ms    Normal sinus rhythm  ST and T wave abnormality, consider inferior ischemia  ST and T wave abnormality, consider anterolateral ischemia  Abnormal ECG  When compared with ECG of 14-FEB-2022 10:47,  No significant change was found  Confirmed by Guido GAN, Robert COSBY ([id number]) on 3/1/2023 3:53:39 PM    Referred By:             Confirmed By:Robert Lora MD         Anesthesia Plan  Type of Anesthesia, risks & benefits discussed:    Anesthesia Type: Gen Natural Airway, MAC  Intra-op Monitoring Plan: Standard ASA Monitors  Post Op Pain Control Plan: multimodal analgesia  Induction:  IV  Informed Consent: Informed consent signed with the Patient and all parties understand the risks and agree with anesthesia plan.  All questions answered.   ASA Score: 3  Day of Surgery Review of History & Physical: H&P Update referred to the surgeon/provider.I have interviewed and examined the patient. I have reviewed the patient's H&P dated: There are no significant changes. H&P completed by Anesthesiologist.    Ready For Surgery From Anesthesia Perspective.     .

## 2023-07-28 NOTE — PROGRESS NOTES
1108 RECEIVED TO RR ASLEEP, EASILY AROUSED. HOB ELEVATED. NO RESP. DISTRESS NOTED. O2 VIA NC. DRESSING LEFT LEG D/I, PEDAL PULSE +, VITALIY. NO C/O PAIN. IV INFUSING WELL LEFT FOREARM. SEE FLOW SHEET.    1138 SLEEPING EASILY AROUSED. NO C/O PAIN. DRESSING D/I. TRANSFERRED TO ROOM WITHOUT DISTRESS NOTED.

## 2023-07-31 ENCOUNTER — ANESTHESIA (OUTPATIENT)
Dept: GASTROENTEROLOGY | Facility: HOSPITAL | Age: 78
End: 2023-07-31
Payer: MEDICARE

## 2023-07-31 ENCOUNTER — ANESTHESIA EVENT (OUTPATIENT)
Dept: GASTROENTEROLOGY | Facility: HOSPITAL | Age: 78
End: 2023-07-31
Payer: MEDICARE

## 2023-07-31 ENCOUNTER — HOSPITAL ENCOUNTER (OUTPATIENT)
Dept: GASTROENTEROLOGY | Facility: HOSPITAL | Age: 78
Discharge: HOME | End: 2023-07-31
Attending: INTERNAL MEDICINE
Payer: MEDICARE

## 2023-07-31 ENCOUNTER — HOSPITAL ENCOUNTER (OUTPATIENT)
Dept: RADIOLOGY | Facility: HOSPITAL | Age: 78
Discharge: HOME | End: 2023-07-31
Attending: FAMILY MEDICINE
Payer: MEDICARE

## 2023-07-31 ENCOUNTER — OFFICE VISIT (OUTPATIENT)
Dept: VASCULAR SURGERY | Facility: CLINIC | Age: 78
End: 2023-07-31
Payer: MEDICARE

## 2023-07-31 VITALS
TEMPERATURE: 98 F | OXYGEN SATURATION: 97 % | HEART RATE: 67 BPM | RESPIRATION RATE: 19 BRPM | SYSTOLIC BLOOD PRESSURE: 179 MMHG | DIASTOLIC BLOOD PRESSURE: 86 MMHG

## 2023-07-31 VITALS
WEIGHT: 207 LBS | HEART RATE: 82 BPM | HEIGHT: 63 IN | SYSTOLIC BLOOD PRESSURE: 144 MMHG | DIASTOLIC BLOOD PRESSURE: 88 MMHG | RESPIRATION RATE: 18 BRPM | BODY MASS INDEX: 36.68 KG/M2

## 2023-07-31 DIAGNOSIS — L81.9 HYPERPIGMENTATION OF SKIN: ICD-10-CM

## 2023-07-31 DIAGNOSIS — M79.605 LEG PAIN, BILATERAL: ICD-10-CM

## 2023-07-31 DIAGNOSIS — I87.2 VENOUS INSUFFICIENCY: ICD-10-CM

## 2023-07-31 DIAGNOSIS — I87.2 VENOUS INSUFFICIENCY: Primary | ICD-10-CM

## 2023-07-31 DIAGNOSIS — R60.0 EDEMA, LOWER EXTREMITY: ICD-10-CM

## 2023-07-31 DIAGNOSIS — R13.14 PHARYNGOESOPHAGEAL DYSPHAGIA: ICD-10-CM

## 2023-07-31 DIAGNOSIS — M79.604 LEG PAIN, BILATERAL: ICD-10-CM

## 2023-07-31 PROCEDURE — D9220A PRA ANESTHESIA: Mod: ,,, | Performed by: NURSE ANESTHETIST, CERTIFIED REGISTERED

## 2023-07-31 PROCEDURE — 99215 OFFICE O/P EST HI 40 MIN: CPT | Mod: PBBFAC,25 | Performed by: FAMILY MEDICINE

## 2023-07-31 PROCEDURE — 43239 EGD BIOPSY SINGLE/MULTIPLE: CPT | Mod: 59 | Performed by: INTERNAL MEDICINE

## 2023-07-31 PROCEDURE — 37000009 HC ANESTHESIA EA ADD 15 MINS

## 2023-07-31 PROCEDURE — 1159F PR MEDICATION LIST DOCUMENTED IN MEDICAL RECORD: ICD-10-PCS | Mod: CPTII,,, | Performed by: FAMILY MEDICINE

## 2023-07-31 PROCEDURE — 99214 OFFICE O/P EST MOD 30 MIN: CPT | Mod: S$PBB,,, | Performed by: FAMILY MEDICINE

## 2023-07-31 PROCEDURE — 25000003 PHARM REV CODE 250: Performed by: NURSE ANESTHETIST, CERTIFIED REGISTERED

## 2023-07-31 PROCEDURE — 3077F PR MOST RECENT SYSTOLIC BLOOD PRESSURE >= 140 MM HG: ICD-10-PCS | Mod: CPTII,,, | Performed by: FAMILY MEDICINE

## 2023-07-31 PROCEDURE — 43248 EGD GUIDE WIRE INSERTION: CPT | Performed by: INTERNAL MEDICINE

## 2023-07-31 PROCEDURE — 27000284 HC CANNULA NASAL: Performed by: NURSE ANESTHETIST, CERTIFIED REGISTERED

## 2023-07-31 PROCEDURE — 1159F MED LIST DOCD IN RCRD: CPT | Mod: CPTII,,, | Performed by: FAMILY MEDICINE

## 2023-07-31 PROCEDURE — 99214 PR OFFICE/OUTPT VISIT, EST, LEVL IV, 30-39 MIN: ICD-10-PCS | Mod: S$PBB,,, | Performed by: FAMILY MEDICINE

## 2023-07-31 PROCEDURE — 1160F RVW MEDS BY RX/DR IN RCRD: CPT | Mod: CPTII,,, | Performed by: FAMILY MEDICINE

## 2023-07-31 PROCEDURE — 1160F PR REVIEW ALL MEDS BY PRESCRIBER/CLIN PHARMACIST DOCUMENTED: ICD-10-PCS | Mod: CPTII,,, | Performed by: FAMILY MEDICINE

## 2023-07-31 PROCEDURE — 88305 TISSUE EXAM BY PATHOLOGIST: CPT | Mod: 26,,, | Performed by: PATHOLOGY

## 2023-07-31 PROCEDURE — 43239 PR EGD, FLEX, W/BIOPSY, SGL/MULTI: ICD-10-PCS | Mod: 59,,, | Performed by: INTERNAL MEDICINE

## 2023-07-31 PROCEDURE — 88305 TISSUE EXAM BY PATHOLOGIST: CPT | Mod: TC,SUR | Performed by: INTERNAL MEDICINE

## 2023-07-31 PROCEDURE — 93971 EXTREMITY STUDY: CPT | Mod: 26,,, | Performed by: FAMILY MEDICINE

## 2023-07-31 PROCEDURE — 37000008 HC ANESTHESIA 1ST 15 MINUTES

## 2023-07-31 PROCEDURE — 63600175 PHARM REV CODE 636 W HCPCS: Performed by: NURSE ANESTHETIST, CERTIFIED REGISTERED

## 2023-07-31 PROCEDURE — 93971 US POST ABLATION VENOUS: ICD-10-PCS | Mod: 26,,, | Performed by: FAMILY MEDICINE

## 2023-07-31 PROCEDURE — 43248 PR EGD, FLEX, W/DILATION OVER GUIDEWIRE: ICD-10-PCS | Mod: ,,, | Performed by: INTERNAL MEDICINE

## 2023-07-31 PROCEDURE — 27201423 OPTIME MED/SURG SUP & DEVICES STERILE SUPPLY

## 2023-07-31 PROCEDURE — 88305 SURGICAL PATHOLOGY: ICD-10-PCS | Mod: 26,,, | Performed by: PATHOLOGY

## 2023-07-31 PROCEDURE — 3079F PR MOST RECENT DIASTOLIC BLOOD PRESSURE 80-89 MM HG: ICD-10-PCS | Mod: CPTII,,, | Performed by: FAMILY MEDICINE

## 2023-07-31 PROCEDURE — 43239 EGD BIOPSY SINGLE/MULTIPLE: CPT | Mod: 59,,, | Performed by: INTERNAL MEDICINE

## 2023-07-31 PROCEDURE — 3079F DIAST BP 80-89 MM HG: CPT | Mod: CPTII,,, | Performed by: FAMILY MEDICINE

## 2023-07-31 PROCEDURE — 93971 EXTREMITY STUDY: CPT | Mod: TC

## 2023-07-31 PROCEDURE — D9220A PRA ANESTHESIA: ICD-10-PCS | Mod: ,,, | Performed by: NURSE ANESTHETIST, CERTIFIED REGISTERED

## 2023-07-31 PROCEDURE — 43248 EGD GUIDE WIRE INSERTION: CPT | Mod: ,,, | Performed by: INTERNAL MEDICINE

## 2023-07-31 PROCEDURE — 3077F SYST BP >= 140 MM HG: CPT | Mod: CPTII,,, | Performed by: FAMILY MEDICINE

## 2023-07-31 RX ORDER — LIDOCAINE HYDROCHLORIDE 20 MG/ML
INJECTION, SOLUTION EPIDURAL; INFILTRATION; INTRACAUDAL; PERINEURAL
Status: DISCONTINUED | OUTPATIENT
Start: 2023-07-31 | End: 2023-07-31

## 2023-07-31 RX ORDER — PROPOFOL 10 MG/ML
VIAL (ML) INTRAVENOUS
Status: DISCONTINUED | OUTPATIENT
Start: 2023-07-31 | End: 2023-07-31

## 2023-07-31 RX ORDER — SODIUM CHLORIDE 0.9 % (FLUSH) 0.9 %
10 SYRINGE (ML) INJECTION
Status: DISCONTINUED | OUTPATIENT
Start: 2023-07-31 | End: 2023-08-01 | Stop reason: HOSPADM

## 2023-07-31 RX ADMIN — LIDOCAINE HYDROCHLORIDE 60 MG: 20 INJECTION, SOLUTION INTRAVENOUS at 11:07

## 2023-07-31 RX ADMIN — SODIUM CHLORIDE: 9 INJECTION, SOLUTION INTRAVENOUS at 11:07

## 2023-07-31 RX ADMIN — PROPOFOL 80 MG: 10 INJECTION, EMULSION INTRAVENOUS at 11:07

## 2023-07-31 NOTE — H&P
Gastroenterology Pre-procedure H&P    Chief Complaint: Pharyngoesophageal dysphagia    History of Present Illness    Quiana Bland is a 78 y.o. female that  has a past medical history of CHF (congestive heart failure), DM (diabetes mellitus), High cholesterol, HTN (hypertension), and Seizures.     Patient with GERD and pharyngoesophageal dysphagia here for EGD. Takes protonix daily..       Past Medical History:   Diagnosis Date    CHF (congestive heart failure)     DM (diabetes mellitus)     High cholesterol     HTN (hypertension)     Seizures        Past Surgical History:   Procedure Laterality Date    CARDIAC CATHETERIZATION       SECTION      x 1    gall stones removed      HYSTERECTOMY      RADIOFREQUENCY ABLATION Right 2023    Procedure: Right GSV RF Ablation;  Surgeon: Brandan Babin DO;  Location: [facility];  Service: Peripheral Vascular;  Laterality: Right;    RADIOFREQUENCY ABLATION Left 2023    Procedure: Left RF Ablation;  Surgeon: Brandan Babin DO;  Location: [facility];  Service: Peripheral Vascular;  Laterality: Left;    TUBAL LIGATION      VAGINAL DELIVERY      x 5       Family History   Problem Relation Age of Onset    Heart disease Mother     No Known Problems Father     Hypertension Sister     Hypertension Sister     Hypertension Brother     Diabetes Daughter     Cancer Son     Diabetes Son     No Known Problems Son     No Known Problems Son        Social History     Socioeconomic History    Marital status:    Tobacco Use    Smoking status: Never    Smokeless tobacco: Never   Substance and Sexual Activity    Alcohol use: Never    Drug use: Yes     Types: Hydrocodone    Sexual activity: Not Currently     Partners: Male     Comment: Spouse        Current Outpatient Medications   Medication Sig Dispense Refill    albuterol 2 mg/5 mL syrup take 1/2 (one-half) TEASPOONFUL (2.5mls) BY MOUTH THREE TIMES DAILY      albuterol-ipratropium (DUO-NEB) 2.5 mg-0.5  mg/3 mL nebulizer solution Ipratropium-Albuterol 0.5-2.5 (3) MG/3ML Inhalation Solution QTY: 120 mL Days: 30 Refills: 11  Written: 22 Patient Instructions: USE 1 VIAL IN NEBULIZER 4 TIMES DAILY      azelastine (ASTELIN) 137 mcg (0.1 %) nasal spray 1 spray 2 (two) times daily.      budesonide (PULMICORT) 0.5 mg/2 mL nebulizer solution Budesonide 0.5 MG/2ML Inhalation Suspension QTY: 120 mL Days: 30 Refills: 11  Written: 22 Patient Instructions: USE 1 VIAL  IN  NEBULIZER TWICE  DAILY - rinse mouth out after each use      chlorhexidine (PERIDEX) 0.12 % solution SMARTSI.5 Ounce(s) By Mouth Twice Daily      cyanocobalamin 1,000 mcg/mL injection Cyanocobalamin 1000 MCG/ML Injection Solution QTY: 1 mL Days: 0 Refills: 0  Written: 22 Patient Instructions: ONCE MONTHLY      dapagliflozin (FARXIGA) 10 mg tablet Take 1 tablet (10 mg total) by mouth once daily. 90 tablet 3    fluticasone propionate (FLONASE) 50 mcg/actuation nasal spray Fluticasone Propionate 50 MCG/ACT Nasal Suspension QTY: 1 gram Days: 30 Refills: 3  Written: 22 Patient Instructions: twice a day 1 spray each nostril      glimepiride (AMARYL) 4 MG tablet       isosorbide mononitrate (IMDUR) 30 MG 24 hr tablet Take 1 tablet (30 mg total) by mouth once daily. 90 tablet 3    levalbuterol (XOPENEX HFA) 45 mcg/actuation inhaler Xopenex HFA 45 MCG/ACT Inhalation Aerosol QTY: 0  Days: 0 Refills: 0  Written: 22 Patient Instructions:      LINZESS 145 mcg Cap capsule TAKE ONE CAPSULE BY MOUTH ONCE DAILY FOR constipation      losartan (COZAAR) 100 MG tablet Losartan Potassium 100 MG Oral Tablet QTY: 90 tablet Days: 90 Refills: 3  Written: 22 Patient Instructions: once a day      metFORMIN (GLUCOPHAGE) 500 MG tablet       metoprolol succinate (TOPROL-XL) 100 MG 24 hr tablet Take 1 tablet (100 mg total) by mouth once daily. 90 tablet 3    morphine (MS CONTIN) 15 MG 12 hr tablet Take 15 mg by mouth nightly.       neomycin-polymyxin-dexamethasone (MAXITROL) 3.5mg/mL-10,000 unit/mL-0.1 % DrpS 1 drop into right EYE FOUR TIMES DAILY FOR 10 DAYS      olopatadine (PATANOL) 0.1 % ophthalmic solution Pataday 0.1% Ophthalmic Solution QTY: 0  Days: 0 Refills: 0  Written: 08/10/22 Patient Instructions:      oxyCODONE-acetaminophen (PERCOCET)  mg per tablet TAKE ONE TABLET BY MOUTH 2 TO 3 times DAILY AS NEEDED FOR PAIN      pantoprazole (PROTONIX) 40 MG tablet       repaglinide (PRANDIN) 1 MG tablet Repaglinide 1 MG Oral Tablet QTY: 60 tablet Days: 30 Refills: 5  Written: 04/11/22 Patient Instructions: twice a day with food      RYMED, DEXCHLORPHENIRAMINE-PE, 2-10 mg Tab Take 1 tablet by mouth 2 (two) times daily.      spironolactone-hydrochlorothiazide 25-25mg (ALDACTAZIDE) 25-25 mg Tab Spironolactone-HCTZ 25-25 MG Oral Tablet QTY: 0 tablet Days: 7 Refills: 0  Written: 11/10/22 Patient Instructions:      valsartan (DIOVAN) 160 MG tablet TAKE ONE TABLET BY MOUTH DAILY (STOP losartan)       No current facility-administered medications for this encounter.     Facility-Administered Medications Ordered in Other Encounters   Medication Dose Route Frequency Provider Last Rate Last Admin    LIDOcaine-EPINEPHrine 1%-1:100,000 30 mL, LIDOcaine HCL 10 mg/ml (1%) 20 mL, sodium bicarbonate 10 mL in sodium chloride 0.9% 500 mL solution   MISCELLANEOUS Once Brandan Babin DO           Review of patient's allergies indicates:  No Known Allergies    Objective:  Vitals:    07/31/23 1053   BP: (!) 166/78   Pulse: 65   Resp: 17   SpO2: 96%        GEN: normal appearing, NAD, AAO x3  HENT: NCAT, anicteric, OP benign  CV: normal rate, regular rhythm  RESP: CTA, symmetric rise, unlabored  ABD: soft, ND, no guarding or TTP  SKIN: warm and dry  NEURO: grossly afocal    Assessment and Plan:    Proceed with:    EGD for dysphagia       Ace Pereira MD  Gastroenterology

## 2023-07-31 NOTE — PROGRESS NOTES
VEIN CENTER CLINIC NOTE    Patient ID: Quiana Bland is a 78 y.o. female.    I. HISTORY     Chief Complaint:   Chief Complaint   Patient presents with    Follow-up     Exam room 3.  3 days s/p Left GSV RF Ablation        HPI: Quiana Bland is a 78 y.o. female who the patient presents 3 days following a left great saphenous vein radiofrequency ablation.  The patient states that she did well over the weekend without complications.  She has already noticed less swelling, heaviness and tightness.  Post ablation ultrasound today shows widely patent left common femoral vein with well ablated left great saphenous vein.  No heat induced thrombus noted.  She had a class 1 heat induced thrombus noted following her right great saphenous vein ablation a week ago.  We will continue to monitor.      Clinical Summary:  Patient initially seen on 02/28/2023 by referral from Dr Ramírez, cardiology, for evaluation of bilateral lower extremity leg swelling and discomfort.  Symptoms are progressive/worsening and began a proximally 2 years ago.  Location is bilateral lower extremities below the knees. Symptoms are worse at the end of the day.  History of venous interventions includes none.  Unknown family history of venous disease.  Patient also sees Dr. Lozoya, pulmonology, and has a history of obstructive sleep apnea and uses a CPAP nightly.  She also sleeps in a chair secondary to orthopnea.  Also complains of right knee pain and swelling and limited ambulation throughout the day.  Recent negative BNP.  Echocardiogram pending.    Bilateral complete venous reflux study 03/08/2023, shows no evidence of DVT bilaterally.  The study also shows dilation and axial reflux of the bilateral great saphenous veins.    Venous Disease Medical Necessity Documentation Initial Visit Date:  02/28/2023 Return Check Date: 06/08/2023   Have you ever had a rupture or bleed from a varicose vein in your leg(s)?              [] Yes  [x] No   []  Yes   [x] No   Have you ever been diagnosed with phlebitis, cellulitis, or inflammation in the area of the varicose veins of  your leg(s)?  [] Yes  [x] No    [] Yes   [x] No   Do you have darkened or inflamed skin on your legs?   [] Yes   [x] No   [] Yes   [x] No   Do you have leg swelling?     [x] Yes   [] No   [x] Yes   [] No   Do you have leg pain?   [x] Yes   [] No   [x] Yes   [] No   If yes, describe the type of pain?    [x]   Stabbing [x]  Radiating [x]  Aching   [x]  Tightness [x]  Throbbing               [x]  Burning [x]  Cramping          No change    Do you have leg discomfort?   [x] Yes   [] No   [x] Yes   [] No   If yes, describe the type of discomfort?    [x]  Heaviness [x]  Fullness   [x]  Restlessness [x] Tired/Fatigued [x] Itching          No change except now no itching    Have you ever worn compression hose?   [x] Yes   [] No   [x] Yes   [] No   If yes, how long?    18 months    3 months   Do you elevate your legs while sitting?   [x] Yes   [] No   [x] Yes   [] No   Does venous disease (varicose veins, ulcers, skin changes, leg pain/swelling) interfere with your daily life?  If yes, check activities you are limited or unable to do.    [x]  Shower  [x]   Walk  []  Exercise  [x] Play with children/grandchildren  [x]  Shop [] Work [x] Stand for any period of time [x] Sleep                               [x] Sitting for an extended period of time.           [x] Yes   [] No   [x] Yes   [] No   No change   Do you exercise/have you tried to exercise (i.e.  Walk our participate in a regular exercise routine)?  [] Yes  [x] No   [] Yes   [x] No   BMI 41.8   38.5          Past Medical History:   Diagnosis Date    CHF (congestive heart failure)     DM (diabetes mellitus)     High cholesterol     HTN (hypertension)     Seizures         Past Surgical History:   Procedure Laterality Date    CARDIAC CATHETERIZATION       SECTION      x 1    gall stones removed      HYSTERECTOMY      RADIOFREQUENCY ABLATION  Right 2023    Procedure: Right GSV RF Ablation;  Surgeon: Brandan Babin DO;  Location: [facility] OR;  Service: Peripheral Vascular;  Laterality: Right;    RADIOFREQUENCY ABLATION Left 2023    Procedure: Left RF Ablation;  Surgeon: Brandan Babin DO;  Location: [facility] OR;  Service: Peripheral Vascular;  Laterality: Left;    TUBAL LIGATION      VAGINAL DELIVERY      x 5       Social History     Tobacco Use   Smoking Status Never   Smokeless Tobacco Never         Current Outpatient Medications:     albuterol 2 mg/5 mL syrup, take 1/2 (one-half) TEASPOONFUL (2.5mls) BY MOUTH THREE TIMES DAILY, Disp: , Rfl:     albuterol-ipratropium (DUO-NEB) 2.5 mg-0.5 mg/3 mL nebulizer solution, Ipratropium-Albuterol 0.5-2.5 (3) MG/3ML Inhalation Solution QTY: 120 mL Days: 30 Refills: 11  Written: 22 Patient Instructions: USE 1 VIAL IN NEBULIZER 4 TIMES DAILY, Disp: , Rfl:     azelastine (ASTELIN) 137 mcg (0.1 %) nasal spray, 1 spray 2 (two) times daily., Disp: , Rfl:     budesonide (PULMICORT) 0.5 mg/2 mL nebulizer solution, Budesonide 0.5 MG/2ML Inhalation Suspension QTY: 120 mL Days: 30 Refills: 11  Written: 22 Patient Instructions: USE 1 VIAL  IN  NEBULIZER TWICE  DAILY - rinse mouth out after each use, Disp: , Rfl:     chlorhexidine (PERIDEX) 0.12 % solution, SMARTSI.5 Ounce(s) By Mouth Twice Daily, Disp: , Rfl:     cyanocobalamin 1,000 mcg/mL injection, Cyanocobalamin 1000 MCG/ML Injection Solution QTY: 1 mL Days: 0 Refills: 0  Written: 22 Patient Instructions: ONCE MONTHLY, Disp: , Rfl:     dapagliflozin (FARXIGA) 10 mg tablet, Take 1 tablet (10 mg total) by mouth once daily., Disp: 90 tablet, Rfl: 3    fluticasone propionate (FLONASE) 50 mcg/actuation nasal spray, Fluticasone Propionate 50 MCG/ACT Nasal Suspension QTY: 1 gram Days: 30 Refills: 3  Written: 22 Patient Instructions: twice a day 1 spray each nostril, Disp: , Rfl:     glimepiride (AMARYL) 4 MG tablet, , Disp: , Rfl:      isosorbide mononitrate (IMDUR) 30 MG 24 hr tablet, Take 1 tablet (30 mg total) by mouth once daily., Disp: 90 tablet, Rfl: 3    levalbuterol (XOPENEX HFA) 45 mcg/actuation inhaler, Xopenex HFA 45 MCG/ACT Inhalation Aerosol QTY: 0  Days: 0 Refills: 0  Written: 07/07/22 Patient Instructions:, Disp: , Rfl:     LINZESS 145 mcg Cap capsule, TAKE ONE CAPSULE BY MOUTH ONCE DAILY FOR constipation, Disp: , Rfl:     losartan (COZAAR) 100 MG tablet, Losartan Potassium 100 MG Oral Tablet QTY: 90 tablet Days: 90 Refills: 3  Written: 08/03/22 Patient Instructions: once a day, Disp: , Rfl:     metFORMIN (GLUCOPHAGE) 500 MG tablet, , Disp: , Rfl:     metoprolol succinate (TOPROL-XL) 100 MG 24 hr tablet, Take 1 tablet (100 mg total) by mouth once daily., Disp: 90 tablet, Rfl: 3    morphine (MS CONTIN) 15 MG 12 hr tablet, Take 15 mg by mouth nightly., Disp: , Rfl:     neomycin-polymyxin-dexamethasone (MAXITROL) 3.5mg/mL-10,000 unit/mL-0.1 % DrpS, 1 drop into right EYE FOUR TIMES DAILY FOR 10 DAYS, Disp: , Rfl:     olopatadine (PATANOL) 0.1 % ophthalmic solution, Pataday 0.1% Ophthalmic Solution QTY: 0  Days: 0 Refills: 0  Written: 08/10/22 Patient Instructions:, Disp: , Rfl:     oxyCODONE-acetaminophen (PERCOCET)  mg per tablet, TAKE ONE TABLET BY MOUTH 2 TO 3 times DAILY AS NEEDED FOR PAIN, Disp: , Rfl:     pantoprazole (PROTONIX) 40 MG tablet, , Disp: , Rfl:     repaglinide (PRANDIN) 1 MG tablet, Repaglinide 1 MG Oral Tablet QTY: 60 tablet Days: 30 Refills: 5  Written: 04/11/22 Patient Instructions: twice a day with food, Disp: , Rfl:     RYMED, DEXCHLORPHENIRAMINE-PE, 2-10 mg Tab, Take 1 tablet by mouth 2 (two) times daily., Disp: , Rfl:     spironolactone-hydrochlorothiazide 25-25mg (ALDACTAZIDE) 25-25 mg Tab, Spironolactone-HCTZ 25-25 MG Oral Tablet QTY: 0 tablet Days: 7 Refills: 0  Written: 11/10/22 Patient Instructions:, Disp: , Rfl:     valsartan (DIOVAN) 160 MG tablet, TAKE ONE TABLET BY MOUTH DAILY (STOP losartan),  Disp: , Rfl:   No current facility-administered medications for this visit.    Facility-Administered Medications Ordered in Other Visits:     LIDOcaine-EPINEPHrine 1%-1:100,000 30 mL, LIDOcaine HCL 10 mg/ml (1%) 20 mL, sodium bicarbonate 10 mL in sodium chloride 0.9% 500 mL solution, , MISCELLANEOUS, Once, Brandan Babin, DO    Review of Systems   Constitutional:  Negative for activity change, chills, diaphoresis, fatigue and fever.   Respiratory:  Negative for cough and shortness of breath.    Cardiovascular:  Positive for leg swelling. Negative for chest pain and claudication.        Hyperpigmentation LE   Gastrointestinal:  Negative for nausea and vomiting.   Musculoskeletal:  Positive for leg pain. Negative for joint swelling.   Integumentary:  Negative for rash and wound.   Neurological:  Negative for weakness and numbness.          II. PHYSICAL EXAM     Physical Exam  Constitutional:       General: She is awake. She is not in acute distress.     Appearance: Normal appearance. She is obese. She is not ill-appearing or toxic-appearing.   HENT:      Head: Normocephalic and atraumatic.   Eyes:      Extraocular Movements: Extraocular movements intact.      Conjunctiva/sclera: Conjunctivae normal.      Pupils: Pupils are equal, round, and reactive to light.   Neck:      Vascular: No carotid bruit or JVD.   Cardiovascular:      Rate and Rhythm: Normal rate and regular rhythm.      Pulses:           Dorsalis pedis pulses are detected w/ Doppler on the right side and detected w/ Doppler on the left side.        Posterior tibial pulses are detected w/ Doppler on the right side and detected w/ Doppler on the left side.      Heart sounds: No murmur heard.  Pulmonary:      Effort: Pulmonary effort is normal. No respiratory distress.      Breath sounds: No stridor. No wheezing, rhonchi or rales.   Musculoskeletal:         General: No swelling, tenderness or deformity.      Right lower le+ Edema present.      Left lower  le+ Edema present.      Comments: No evidence of cellulitis, weeping or open ulceration bilaterally.   Feet:      Comments: Triphasic hand-held dopplerable pulses of the bilateral dorsalis pedis and posterior tibial arteries.  Skin:     General: Skin is warm.      Capillary Refill: Capillary refill takes less than 2 seconds.      Coloration: Skin is not ashen.      Findings: No bruising, erythema, lesion, rash or wound.   Neurological:      Mental Status: She is alert and oriented to person, place, and time.      Motor: No weakness.   Psychiatric:         Speech: Speech normal.         Behavior: Behavior normal. Behavior is cooperative.         Reticular/Spider veins noted:  RLE: none  LLE: none    Varicose veins noted:  RLE: none  LLE:  none    CEAP Classification  Clinical Signs: Class 4 - Skin changes ascribed to venous disease  Etiologic Classification: Primary  Anatomic distribution: Superficial  Pathophysiologic dysfunction: Reflux                         Venous Clinical Severity Score  Pain:2=Daily, moderate activity limitation, occasional analgesics  Varicose Veins: 1=Few, scattered. Branch varicose veins  Venous Edema: 2=Afternoon edema, above ankle  Pigmentation: 1=Diffuse, but limited in area and old (brown)  Inflammation: 0=None  Induration: 0=None  Number of Active Ulcers: 0=0  Active Ulceration, Duration: 0=None  Active Ulcer Size: 0=None  Compressive Therapy: 3=Full compliance, stockings + elevation  Total Score: 9       III. ASSESSMENT & PLAN (MEDICAL DECISION MAKING)     1. Venous insufficiency    2. Leg pain, bilateral    3. Edema, lower extremity    4. Hyperpigmentation of skin      Assessment/Diagnosis and Plan:  Continue postoperative compression over the next 2 weeks.  Encouraged to continue with leg exercise, leg elevation and compression daily.     Brandan Babin, DO

## 2023-07-31 NOTE — DISCHARGE INSTRUCTIONS
Procedure Date  7/31/23     Impression  Overall Impression:   2 cm type I hiatal hernia  The upper third of the esophagus, middle third of the esophagus, lower third of the esophagus, Z-line, cardia, body of the stomach, incisura, antrum, pylorus, duodenal bulb, 1st part of the duodenum and 2nd part of the duodenum appeared normal. Performed random biopsy to rule out eosinophilic esophagitis.  Dilated in the esophagus with Savary-Guadalupe dilator to 51 Fr ending size. Dilation caused mucosal tears and improved passage of the scope; post-dilation mucosal tears were superficial     Recommendation    Await pathology results  Continue daily protonix  If dilation improves symptoms, can repeat in the future as needed      Outcome of procedure: successful EGD  Disposition: patient to recovery following procedure; discharge to home when appropriate parameters met  Provisions for follow up: please call my office for any unexpected symptoms like chest or abdominal pain or bleeding following your procedure.  Final Diagnosis: dysphagia      NO DRIVING, OPERATING EQUIPMENT, OR SIGNING LEGAL DOCUMENTS FOR 24 HOURS.     THE NURSE WILL CALL YOU WITH YOUR BIOPSY RESULTS IN A FEW DAYS.

## 2023-07-31 NOTE — TRANSFER OF CARE
Anesthesia Transfer of Care Note    Patient: Quiana Bland    Procedure(s) Performed: * EGD with biopsy*    Patient location: GI    Anesthesia Type: general    Transport from OR: Transported from OR on room air with adequate spontaneous ventilation. Continuous ECG monitoring in transport. Continuous SpO2 monitoring in transport    Post pain: adequate analgesia    Post assessment: no apparent anesthetic complications    Post vital signs: stable    Level of consciousness: sedated and responds to stimulation    Nausea/Vomiting: no nausea/vomiting    Complications: none    Transfer of care protocol was followedComments: Good SV continue, NAD, VSS, RTRN      Last vitals:   Visit Vitals  BP (!) 164/102   Pulse 71   Temp 36.7 °C (98 °F)   Resp (!) 22   SpO2 98%   Breastfeeding No

## 2023-07-31 NOTE — ANESTHESIA POSTPROCEDURE EVALUATION
Anesthesia Post Evaluation    Patient: Quiana Bland    Procedure(s) Performed: * EGD *    Final Anesthesia Type: general      Patient location during evaluation: GI PACU  Patient participation: Yes- Able to Participate  Level of consciousness: awake and alert  Post-procedure vital signs: reviewed and stable  Pain management: adequate  Airway patency: patent    PONV status at discharge: No PONV  Anesthetic complications: no      Cardiovascular status: blood pressure returned to baseline and hemodynamically stable  Respiratory status: spontaneous ventilation  Hydration status: euvolemic  Follow-up not needed.  Comments: Pt voices appreciation for care          Vitals Value Taken Time   /86 07/31/23 1217   Temp 36.7 °C (98 °F) 07/31/23 1142   Pulse 61 07/31/23 1217   Resp 19 07/31/23 1217   SpO2 97 % 07/31/23 1217   Vitals shown include unvalidated device data.      Event Time   Out of Recovery 12:18:06         Pain/Sergo Score: Sergo Score: 10 (7/31/2023 11:55 AM)

## 2023-07-31 NOTE — ANESTHESIA PREPROCEDURE EVALUATION
2023  Quiana Bland is a 78 y.o., female.    Patient Active Problem List   Diagnosis    HTN (hypertension)    Chest pain    Acute on chronic heart failure with preserved ejection fraction    Leg pain, bilateral    Hyperpigmentation of skin    Edema, lower extremity    Venous insufficiency     Past Medical History:   Diagnosis Date    CHF (congestive heart failure)     DM (diabetes mellitus)     High cholesterol     HTN (hypertension)     Seizures        Past Surgical History:   Procedure Laterality Date    CARDIAC CATHETERIZATION       SECTION      x 1    gall stones removed      HYSTERECTOMY      RADIOFREQUENCY ABLATION Right 2023    Procedure: Right GSV RF Ablation;  Surgeon: Brandan Babin DO;  Location: [facility], [place];  Service: Peripheral Vascular;  Laterality: Right;    RADIOFREQUENCY ABLATION Left 2023    Procedure: Left RF Ablation;  Surgeon: Brandan Babin DO;  Location: [facility] OR;  Service: Peripheral Vascular;  Laterality: Left;    TUBAL LIGATION      VAGINAL DELIVERY      x 5       Family History   Problem Relation Age of Onset    Heart disease Mother     No Known Problems Father     Hypertension Sister     Hypertension Sister     Hypertension Brother     Diabetes Daughter     Cancer Son     Diabetes Son     No Known Problems Son     No Known Problems Son        Social History     Socioeconomic History    Marital status:    Tobacco Use    Smoking status: Never    Smokeless tobacco: Never   Substance and Sexual Activity    Alcohol use: Never    Drug use: Yes     Types: Hydrocodone    Sexual activity: Not Currently     Partners: Male     Comment: Spouse        Current Outpatient Medications   Medication Sig Dispense Refill    albuterol 2 mg/5 mL syrup take 1/2 (one-half) TEASPOONFUL (2.5mls) BY MOUTH THREE TIMES  DAILY      albuterol-ipratropium (DUO-NEB) 2.5 mg-0.5 mg/3 mL nebulizer solution Ipratropium-Albuterol 0.5-2.5 (3) MG/3ML Inhalation Solution QTY: 120 mL Days: 30 Refills: 11  Written: 22 Patient Instructions: USE 1 VIAL IN NEBULIZER 4 TIMES DAILY      azelastine (ASTELIN) 137 mcg (0.1 %) nasal spray 1 spray 2 (two) times daily.      budesonide (PULMICORT) 0.5 mg/2 mL nebulizer solution Budesonide 0.5 MG/2ML Inhalation Suspension QTY: 120 mL Days: 30 Refills: 11  Written: 22 Patient Instructions: USE 1 VIAL  IN  NEBULIZER TWICE  DAILY - rinse mouth out after each use      chlorhexidine (PERIDEX) 0.12 % solution SMARTSI.5 Ounce(s) By Mouth Twice Daily      cyanocobalamin 1,000 mcg/mL injection Cyanocobalamin 1000 MCG/ML Injection Solution QTY: 1 mL Days: 0 Refills: 0  Written: 22 Patient Instructions: ONCE MONTHLY      dapagliflozin (FARXIGA) 10 mg tablet Take 1 tablet (10 mg total) by mouth once daily. 90 tablet 3    fluticasone propionate (FLONASE) 50 mcg/actuation nasal spray Fluticasone Propionate 50 MCG/ACT Nasal Suspension QTY: 1 gram Days: 30 Refills: 3  Written: 22 Patient Instructions: twice a day 1 spray each nostril      glimepiride (AMARYL) 4 MG tablet       isosorbide mononitrate (IMDUR) 30 MG 24 hr tablet Take 1 tablet (30 mg total) by mouth once daily. 90 tablet 3    levalbuterol (XOPENEX HFA) 45 mcg/actuation inhaler Xopenex HFA 45 MCG/ACT Inhalation Aerosol QTY: 0  Days: 0 Refills: 0  Written: 22 Patient Instructions:      LINZESS 145 mcg Cap capsule TAKE ONE CAPSULE BY MOUTH ONCE DAILY FOR constipation      losartan (COZAAR) 100 MG tablet Losartan Potassium 100 MG Oral Tablet QTY: 90 tablet Days: 90 Refills: 3  Written: 22 Patient Instructions: once a day      metFORMIN (GLUCOPHAGE) 500 MG tablet       metoprolol succinate (TOPROL-XL) 100 MG 24 hr tablet Take 1 tablet (100 mg total) by mouth once daily. 90 tablet 3    morphine (MS CONTIN) 15 MG  12 hr tablet Take 15 mg by mouth nightly.      neomycin-polymyxin-dexamethasone (MAXITROL) 3.5mg/mL-10,000 unit/mL-0.1 % DrpS 1 drop into right EYE FOUR TIMES DAILY FOR 10 DAYS      olopatadine (PATANOL) 0.1 % ophthalmic solution Pataday 0.1% Ophthalmic Solution QTY: 0  Days: 0 Refills: 0  Written: 08/10/22 Patient Instructions:      oxyCODONE-acetaminophen (PERCOCET)  mg per tablet TAKE ONE TABLET BY MOUTH 2 TO 3 times DAILY AS NEEDED FOR PAIN      pantoprazole (PROTONIX) 40 MG tablet       repaglinide (PRANDIN) 1 MG tablet Repaglinide 1 MG Oral Tablet QTY: 60 tablet Days: 30 Refills: 5  Written: 04/11/22 Patient Instructions: twice a day with food      RYMED, DEXCHLORPHENIRAMINE-PE, 2-10 mg Tab Take 1 tablet by mouth 2 (two) times daily.      spironolactone-hydrochlorothiazide 25-25mg (ALDACTAZIDE) 25-25 mg Tab Spironolactone-HCTZ 25-25 MG Oral Tablet QTY: 0 tablet Days: 7 Refills: 0  Written: 11/10/22 Patient Instructions:      valsartan (DIOVAN) 160 MG tablet TAKE ONE TABLET BY MOUTH DAILY (STOP losartan)       No current facility-administered medications for this encounter.     Facility-Administered Medications Ordered in Other Encounters   Medication Dose Route Frequency Provider Last Rate Last Admin    LIDOcaine-EPINEPHrine 1%-1:100,000 30 mL, LIDOcaine HCL 10 mg/ml (1%) 20 mL, sodium bicarbonate 10 mL in sodium chloride 0.9% 500 mL solution   MISCELLANEOUS Once Brandan Babin, DO           Review of patient's allergies indicates:  No Known Allergies    Pre-op Assessment    I have reviewed the Patient Summary Reports.     I have reviewed the Nursing Notes. I have reviewed the NPO Status.   I have reviewed the Medications.     Review of Systems  Anesthesia Hx:  No problems with previous Anesthesia    Cardiovascular:   Hypertension CHF    Neurological:   Seizures    Endocrine:   Diabetes  Obesity / BMI > 30      Physical Exam  General: Well nourished, Alert, Oriented and  Cooperative    Airway:  Mallampati: II   Mouth Opening: Normal  Neck ROM: Normal ROM    Dental:  Intact    Chest/Lungs:  Normal Respiratory Rate    Heart:  Rate: Normal        Anesthesia Plan  Type of Anesthesia, risks & benefits discussed:    Anesthesia Type: Gen Natural Airway, MAC  Intra-op Monitoring Plan: Standard ASA Monitors  Post Op Pain Control Plan: multimodal analgesia and IV/PO Opioids PRN  Induction:  IV  Informed Consent: Informed consent signed with the Patient and all parties understand the risks and agree with anesthesia plan.  All questions answered. Patient consented to blood products? Yes  ASA Score: 3  Day of Surgery Review of History & Physical: I have interviewed and examined the patient. I have reviewed the patient's H&P dated: There are no significant changes.     Ready For Surgery From Anesthesia Perspective.     .

## 2023-08-01 LAB
ESTROGEN SERPL-MCNC: NORMAL PG/ML
INSULIN SERPL-ACNC: NORMAL U[IU]/ML
LAB AP GROSS DESCRIPTION: NORMAL
LAB AP LABORATORY NOTES: NORMAL
T3RU NFR SERPL: NORMAL %

## 2023-08-22 DIAGNOSIS — G90.09 IDIOPATHIC PERIPHERAL AUTONOMIC NEUROPATHY: Primary | ICD-10-CM

## 2023-08-28 ENCOUNTER — HOSPITAL ENCOUNTER (OUTPATIENT)
Dept: RADIOLOGY | Facility: HOSPITAL | Age: 78
Discharge: HOME | End: 2023-08-28
Attending: FAMILY MEDICINE
Payer: MEDICARE

## 2023-08-28 ENCOUNTER — OFFICE VISIT (OUTPATIENT)
Dept: VASCULAR SURGERY | Facility: CLINIC | Age: 78
End: 2023-08-28
Payer: MEDICARE

## 2023-08-28 VITALS
BODY MASS INDEX: 38.42 KG/M2 | SYSTOLIC BLOOD PRESSURE: 122 MMHG | DIASTOLIC BLOOD PRESSURE: 82 MMHG | RESPIRATION RATE: 18 BRPM | HEIGHT: 62 IN | HEART RATE: 76 BPM | WEIGHT: 208.81 LBS

## 2023-08-28 DIAGNOSIS — M79.605 LEG PAIN, BILATERAL: ICD-10-CM

## 2023-08-28 DIAGNOSIS — M25.562 CHRONIC PAIN OF BOTH KNEES: ICD-10-CM

## 2023-08-28 DIAGNOSIS — G89.29 CHRONIC PAIN OF BOTH KNEES: ICD-10-CM

## 2023-08-28 DIAGNOSIS — M79.604 LEG PAIN, BILATERAL: ICD-10-CM

## 2023-08-28 DIAGNOSIS — M25.561 CHRONIC PAIN OF BOTH KNEES: ICD-10-CM

## 2023-08-28 DIAGNOSIS — I87.2 VENOUS INSUFFICIENCY: Primary | ICD-10-CM

## 2023-08-28 DIAGNOSIS — I87.2 VENOUS INSUFFICIENCY: ICD-10-CM

## 2023-08-28 DIAGNOSIS — L81.9 HYPERPIGMENTATION OF SKIN: ICD-10-CM

## 2023-08-28 DIAGNOSIS — R60.0 EDEMA, LOWER EXTREMITY: ICD-10-CM

## 2023-08-28 PROCEDURE — 1160F RVW MEDS BY RX/DR IN RCRD: CPT | Mod: CPTII,,, | Performed by: FAMILY MEDICINE

## 2023-08-28 PROCEDURE — 1125F PR PAIN SEVERITY QUANTIFIED, PAIN PRESENT: ICD-10-PCS | Mod: CPTII,,, | Performed by: FAMILY MEDICINE

## 2023-08-28 PROCEDURE — 1125F AMNT PAIN NOTED PAIN PRSNT: CPT | Mod: CPTII,,, | Performed by: FAMILY MEDICINE

## 2023-08-28 PROCEDURE — 93971 EXTREMITY STUDY: CPT | Mod: 26,,, | Performed by: FAMILY MEDICINE

## 2023-08-28 PROCEDURE — 1159F MED LIST DOCD IN RCRD: CPT | Mod: CPTII,,, | Performed by: FAMILY MEDICINE

## 2023-08-28 PROCEDURE — 93971 US POST ABLATION VENOUS: ICD-10-PCS | Mod: 26,,, | Performed by: FAMILY MEDICINE

## 2023-08-28 PROCEDURE — 3288F PR FALLS RISK ASSESSMENT DOCUMENTED: ICD-10-PCS | Mod: CPTII,,, | Performed by: FAMILY MEDICINE

## 2023-08-28 PROCEDURE — 3074F SYST BP LT 130 MM HG: CPT | Mod: CPTII,,, | Performed by: FAMILY MEDICINE

## 2023-08-28 PROCEDURE — 3074F PR MOST RECENT SYSTOLIC BLOOD PRESSURE < 130 MM HG: ICD-10-PCS | Mod: CPTII,,, | Performed by: FAMILY MEDICINE

## 2023-08-28 PROCEDURE — 1101F PT FALLS ASSESS-DOCD LE1/YR: CPT | Mod: CPTII,,, | Performed by: FAMILY MEDICINE

## 2023-08-28 PROCEDURE — 99214 OFFICE O/P EST MOD 30 MIN: CPT | Mod: S$PBB,,, | Performed by: FAMILY MEDICINE

## 2023-08-28 PROCEDURE — 3288F FALL RISK ASSESSMENT DOCD: CPT | Mod: CPTII,,, | Performed by: FAMILY MEDICINE

## 2023-08-28 PROCEDURE — 99215 OFFICE O/P EST HI 40 MIN: CPT | Mod: PBBFAC,25 | Performed by: FAMILY MEDICINE

## 2023-08-28 PROCEDURE — 93971 EXTREMITY STUDY: CPT | Mod: TC

## 2023-08-28 PROCEDURE — 1160F PR REVIEW ALL MEDS BY PRESCRIBER/CLIN PHARMACIST DOCUMENTED: ICD-10-PCS | Mod: CPTII,,, | Performed by: FAMILY MEDICINE

## 2023-08-28 PROCEDURE — 99214 PR OFFICE/OUTPT VISIT, EST, LEVL IV, 30-39 MIN: ICD-10-PCS | Mod: S$PBB,,, | Performed by: FAMILY MEDICINE

## 2023-08-28 PROCEDURE — 73560 X-RAY EXAM OF KNEE 1 OR 2: CPT | Mod: TC,50

## 2023-08-28 PROCEDURE — 1101F PR PT FALLS ASSESS DOC 0-1 FALLS W/OUT INJ PAST YR: ICD-10-PCS | Mod: CPTII,,, | Performed by: FAMILY MEDICINE

## 2023-08-28 PROCEDURE — 3079F PR MOST RECENT DIASTOLIC BLOOD PRESSURE 80-89 MM HG: ICD-10-PCS | Mod: CPTII,,, | Performed by: FAMILY MEDICINE

## 2023-08-28 PROCEDURE — 3079F DIAST BP 80-89 MM HG: CPT | Mod: CPTII,,, | Performed by: FAMILY MEDICINE

## 2023-08-28 PROCEDURE — 1159F PR MEDICATION LIST DOCUMENTED IN MEDICAL RECORD: ICD-10-PCS | Mod: CPTII,,, | Performed by: FAMILY MEDICINE

## 2023-08-28 NOTE — PROGRESS NOTES
VEIN CENTER CLINIC NOTE    Patient ID: Quiana Bland is a 78 y.o. female.    I. HISTORY     Chief Complaint:   Chief Complaint   Patient presents with    Results     Exam room 2.  1 month s/p Bilateral GSV RF Ablation.        HPI: Quiana Bland is a 78 y.o. female who the patient presents one-month status post bilateral great saphenous vein ablation.  Patient had previous history of a heat induced thrombus on the right.  Today's post ablation ultrasound shows no evidence of heat induced thrombus bilaterally with well ablated great saphenous veins bilaterally.  No signs of complication on ultrasound.  Patient states she is had less swelling of her lower legs below the knees, cramping and cold feet.  But, states she feels like she is had more knee pain, knee swelling and even thigh swelling since the procedure.  It does not sound like it is related to venous procedures.  It sounds more musculoskeletal in nature.  She states that she is seen orthopod before which recommended surgery on her right knee she never followed up.    Clinical Summary:  Patient initially seen on 02/28/2023 by referral from Dr Ramírez, cardiology, for evaluation of bilateral lower extremity leg swelling and discomfort.  Symptoms are progressive/worsening and began a proximally 2 years ago.  Location is bilateral lower extremities below the knees. Symptoms are worse at the end of the day.  History of venous interventions includes none.  Unknown family history of venous disease.  Patient also sees Dr. Lozoya, pulmonology, and has a history of obstructive sleep apnea and uses a CPAP nightly.  She also sleeps in a chair secondary to orthopnea.  Also complains of right knee pain and swelling and limited ambulation throughout the day.  Recent negative BNP.  Echocardiogram pending.    Bilateral complete venous reflux study 03/08/2023, shows no evidence of DVT bilaterally.  The study also shows dilation and axial reflux of the bilateral great  saphenous veins.    Venous Disease Medical Necessity Documentation Initial Visit Date:  02/28/2023 Return Check Date: 06/08/2023   Have you ever had a rupture or bleed from a varicose vein in your leg(s)?              [] Yes  [x] No   [] Yes   [x] No   Have you ever been diagnosed with phlebitis, cellulitis, or inflammation in the area of the varicose veins of  your leg(s)?  [] Yes  [x] No    [] Yes   [x] No   Do you have darkened or inflamed skin on your legs?   [] Yes   [x] No   [] Yes   [x] No   Do you have leg swelling?     [x] Yes   [] No   [x] Yes   [] No   Do you have leg pain?   [x] Yes   [] No   [x] Yes   [] No   If yes, describe the type of pain?    [x]   Stabbing [x]  Radiating [x]  Aching   [x]  Tightness [x]  Throbbing               [x]  Burning [x]  Cramping          No change    Do you have leg discomfort?   [x] Yes   [] No   [x] Yes   [] No   If yes, describe the type of discomfort?    [x]  Heaviness [x]  Fullness   [x]  Restlessness [x] Tired/Fatigued [x] Itching          No change except now no itching    Have you ever worn compression hose?   [x] Yes   [] No   [x] Yes   [] No   If yes, how long?    18 months    3 months   Do you elevate your legs while sitting?   [x] Yes   [] No   [x] Yes   [] No   Does venous disease (varicose veins, ulcers, skin changes, leg pain/swelling) interfere with your daily life?  If yes, check activities you are limited or unable to do.    [x]  Shower  [x]   Walk  []  Exercise  [x] Play with children/grandchildren  [x]  Shop [] Work [x] Stand for any period of time [x] Sleep                               [x] Sitting for an extended period of time.           [x] Yes   [] No   [x] Yes   [] No   No change   Do you exercise/have you tried to exercise (i.e.  Walk our participate in a regular exercise routine)?  [] Yes  [x] No   [] Yes   [x] No   BMI 41.8   38.5          Past Medical History:   Diagnosis Date    CHF (congestive heart failure)     DM (diabetes mellitus)      High cholesterol     HTN (hypertension)     Seizures         Past Surgical History:   Procedure Laterality Date    CARDIAC CATHETERIZATION       SECTION      x 1    gall stones removed      HYSTERECTOMY      RADIOFREQUENCY ABLATION Right 2023    Procedure: Right GSV RF Ablation;  Surgeon: Brandan Babin DO;  Location: [facility] OR;  Service: Peripheral Vascular;  Laterality: Right;    RADIOFREQUENCY ABLATION Left 2023    Procedure: Left RF Ablation;  Surgeon: Brandan Babin DO;  Location: [facility] OR;  Service: Peripheral Vascular;  Laterality: Left;    TUBAL LIGATION      VAGINAL DELIVERY      x 5       Social History     Tobacco Use   Smoking Status Never   Smokeless Tobacco Never         Current Outpatient Medications:     albuterol 2 mg/5 mL syrup, take 1/2 (one-half) TEASPOONFUL (2.5mls) BY MOUTH THREE TIMES DAILY, Disp: , Rfl:     albuterol-ipratropium (DUO-NEB) 2.5 mg-0.5 mg/3 mL nebulizer solution, Ipratropium-Albuterol 0.5-2.5 (3) MG/3ML Inhalation Solution QTY: 120 mL Days: 30 Refills: 11  Written: 22 Patient Instructions: USE 1 VIAL IN NEBULIZER 4 TIMES DAILY, Disp: , Rfl:     azelastine (ASTELIN) 137 mcg (0.1 %) nasal spray, 1 spray 2 (two) times daily., Disp: , Rfl:     budesonide (PULMICORT) 0.5 mg/2 mL nebulizer solution, Budesonide 0.5 MG/2ML Inhalation Suspension QTY: 120 mL Days: 30 Refills: 11  Written: 22 Patient Instructions: USE 1 VIAL  IN  NEBULIZER TWICE  DAILY - rinse mouth out after each use, Disp: , Rfl:     chlorhexidine (PERIDEX) 0.12 % solution, SMARTSI.5 Ounce(s) By Mouth Twice Daily, Disp: , Rfl:     cyanocobalamin 1,000 mcg/mL injection, Cyanocobalamin 1000 MCG/ML Injection Solution QTY: 1 mL Days: 0 Refills: 0  Written: 22 Patient Instructions: ONCE MONTHLY, Disp: , Rfl:     dapagliflozin (FARXIGA) 10 mg tablet, Take 1 tablet (10 mg total) by mouth once daily., Disp: 90 tablet, Rfl: 3    fluticasone propionate (FLONASE) 50 mcg/actuation  nasal spray, Fluticasone Propionate 50 MCG/ACT Nasal Suspension QTY: 1 gram Days: 30 Refills: 3  Written: 01/11/22 Patient Instructions: twice a day 1 spray each nostril, Disp: , Rfl:     glimepiride (AMARYL) 4 MG tablet, , Disp: , Rfl:     isosorbide mononitrate (IMDUR) 30 MG 24 hr tablet, Take 1 tablet (30 mg total) by mouth once daily., Disp: 90 tablet, Rfl: 3    levalbuterol (XOPENEX HFA) 45 mcg/actuation inhaler, Xopenex HFA 45 MCG/ACT Inhalation Aerosol QTY: 0  Days: 0 Refills: 0  Written: 07/07/22 Patient Instructions:, Disp: , Rfl:     LINZESS 145 mcg Cap capsule, TAKE ONE CAPSULE BY MOUTH ONCE DAILY FOR constipation, Disp: , Rfl:     losartan (COZAAR) 100 MG tablet, Losartan Potassium 100 MG Oral Tablet QTY: 90 tablet Days: 90 Refills: 3  Written: 08/03/22 Patient Instructions: once a day, Disp: , Rfl:     metFORMIN (GLUCOPHAGE) 500 MG tablet, , Disp: , Rfl:     metoprolol succinate (TOPROL-XL) 100 MG 24 hr tablet, Take 1 tablet (100 mg total) by mouth once daily., Disp: 90 tablet, Rfl: 3    morphine (MS CONTIN) 15 MG 12 hr tablet, Take 15 mg by mouth nightly., Disp: , Rfl:     neomycin-polymyxin-dexamethasone (MAXITROL) 3.5mg/mL-10,000 unit/mL-0.1 % DrpS, 1 drop into right EYE FOUR TIMES DAILY FOR 10 DAYS, Disp: , Rfl:     olopatadine (PATANOL) 0.1 % ophthalmic solution, Pataday 0.1% Ophthalmic Solution QTY: 0  Days: 0 Refills: 0  Written: 08/10/22 Patient Instructions:, Disp: , Rfl:     oxyCODONE-acetaminophen (PERCOCET)  mg per tablet, TAKE ONE TABLET BY MOUTH 2 TO 3 times DAILY AS NEEDED FOR PAIN, Disp: , Rfl:     pantoprazole (PROTONIX) 40 MG tablet, , Disp: , Rfl:     repaglinide (PRANDIN) 1 MG tablet, Repaglinide 1 MG Oral Tablet QTY: 60 tablet Days: 30 Refills: 5  Written: 04/11/22 Patient Instructions: twice a day with food, Disp: , Rfl:     RYMED, DEXCHLORPHENIRAMINE-PE, 2-10 mg Tab, Take 1 tablet by mouth 2 (two) times daily., Disp: , Rfl:     spironolactone-hydrochlorothiazide 25-25mg  (ALDACTAZIDE) 25-25 mg Tab, Spironolactone-HCTZ 25-25 MG Oral Tablet QTY: 0 tablet Days: 7 Refills: 0  Written: 11/10/22 Patient Instructions:, Disp: , Rfl:     valsartan (DIOVAN) 160 MG tablet, TAKE ONE TABLET BY MOUTH DAILY (STOP losartan), Disp: , Rfl:   No current facility-administered medications for this visit.    Facility-Administered Medications Ordered in Other Visits:     LIDOcaine-EPINEPHrine 1%-1:100,000 30 mL, LIDOcaine HCL 10 mg/ml (1%) 20 mL, sodium bicarbonate 10 mL in sodium chloride 0.9% 500 mL solution, , MISCELLANEOUS, Once, Brandan Babin, DO    Review of Systems   Constitutional:  Negative for activity change, chills, diaphoresis, fatigue and fever.   Respiratory:  Negative for cough and shortness of breath.    Cardiovascular:  Positive for leg swelling. Negative for chest pain and claudication.        Hyperpigmentation LE   Gastrointestinal:  Negative for nausea and vomiting.   Musculoskeletal:  Positive for leg pain. Negative for joint swelling.   Integumentary:  Negative for rash and wound.   Neurological:  Negative for weakness and numbness.          II. PHYSICAL EXAM     Physical Exam  Constitutional:       General: She is awake. She is not in acute distress.     Appearance: Normal appearance. She is obese. She is not ill-appearing or toxic-appearing.   HENT:      Head: Normocephalic and atraumatic.   Eyes:      Extraocular Movements: Extraocular movements intact.      Conjunctiva/sclera: Conjunctivae normal.      Pupils: Pupils are equal, round, and reactive to light.   Neck:      Vascular: No carotid bruit or JVD.   Cardiovascular:      Rate and Rhythm: Normal rate and regular rhythm.      Pulses:           Dorsalis pedis pulses are detected w/ Doppler on the right side and detected w/ Doppler on the left side.        Posterior tibial pulses are detected w/ Doppler on the right side and detected w/ Doppler on the left side.      Heart sounds: No murmur heard.  Pulmonary:      Effort:  Pulmonary effort is normal. No respiratory distress.      Breath sounds: No stridor. No wheezing, rhonchi or rales.   Musculoskeletal:         General: No swelling, tenderness or deformity.      Right lower le+ Edema present.      Left lower le+ Edema present.      Comments: No evidence of cellulitis, weeping or open ulceration bilaterally.   Feet:      Comments: Triphasic hand-held dopplerable pulses of the bilateral dorsalis pedis and posterior tibial arteries.  Skin:     General: Skin is warm.      Capillary Refill: Capillary refill takes less than 2 seconds.      Coloration: Skin is not ashen.      Findings: No bruising, erythema, lesion, rash or wound.   Neurological:      Mental Status: She is alert and oriented to person, place, and time.      Motor: No weakness.   Psychiatric:         Speech: Speech normal.         Behavior: Behavior normal. Behavior is cooperative.         Reticular/Spider veins noted:  RLE: none  LLE: none    Varicose veins noted:  RLE: none  LLE:  none    CEAP Classification  Clinical Signs: Class 4 - Skin changes ascribed to venous disease  Etiologic Classification: Primary  Anatomic distribution: Superficial  Pathophysiologic dysfunction: Reflux                         Venous Clinical Severity Score  Pain:2=Daily, moderate activity limitation, occasional analgesics  Varicose Veins: 1=Few, scattered. Branch varicose veins  Venous Edema: 2=Afternoon edema, above ankle  Pigmentation: 1=Diffuse, but limited in area and old (brown)  Inflammation: 0=None  Induration: 0=None  Number of Active Ulcers: 0=0  Active Ulceration, Duration: 0=None  Active Ulcer Size: 0=None  Compressive Therapy: 3=Full compliance, stockings + elevation  Total Score: 9       III. ASSESSMENT & PLAN (MEDICAL DECISION MAKING)     1. Venous insufficiency    2. Chronic pain of both knees    3. Hyperpigmentation of skin    4. Leg pain, bilateral    5. Edema, lower extremity        Assessment/Diagnosis and Plan:  Due  to the patient's continued knee pain and swelling I will order bilateral knee x-rays and call the patient with results.      Continues to wear daily compression along leg exercise and leg elevation.  Follow-up in 2 months for three-month post ablation with ultrasound.      Orders Placed This Encounter    X-Ray Knee 1 or 2 View Bilateral    US Post Ablation Venous     Brandan Babin, DO

## 2023-09-01 ENCOUNTER — TELEPHONE (OUTPATIENT)
Dept: VASCULAR SURGERY | Facility: CLINIC | Age: 78
End: 2023-09-01
Payer: MEDICARE

## 2023-09-01 DIAGNOSIS — M25.561 PAIN IN BOTH KNEES, UNSPECIFIED CHRONICITY: Primary | ICD-10-CM

## 2023-09-01 DIAGNOSIS — M25.562 PAIN IN BOTH KNEES, UNSPECIFIED CHRONICITY: Primary | ICD-10-CM

## 2023-09-18 DIAGNOSIS — M25.561 PAIN IN BOTH KNEES, UNSPECIFIED CHRONICITY: Primary | ICD-10-CM

## 2023-09-18 DIAGNOSIS — M25.562 PAIN IN BOTH KNEES, UNSPECIFIED CHRONICITY: Primary | ICD-10-CM

## 2023-09-20 DIAGNOSIS — I10 PRIMARY HYPERTENSION: Primary | ICD-10-CM

## 2023-09-21 ENCOUNTER — OFFICE VISIT (OUTPATIENT)
Dept: ORTHOPEDICS | Facility: CLINIC | Age: 78
End: 2023-09-21
Payer: MEDICARE

## 2023-09-21 ENCOUNTER — HOSPITAL ENCOUNTER (OUTPATIENT)
Dept: RADIOLOGY | Facility: HOSPITAL | Age: 78
Discharge: HOME | End: 2023-09-21
Attending: ORTHOPAEDIC SURGERY
Payer: MEDICARE

## 2023-09-21 DIAGNOSIS — G56.03 CARPAL TUNNEL SYNDROME ON BOTH SIDES: ICD-10-CM

## 2023-09-21 DIAGNOSIS — M17.0 PRIMARY OSTEOARTHRITIS OF BOTH KNEES: Primary | ICD-10-CM

## 2023-09-21 DIAGNOSIS — M25.562 PAIN IN BOTH KNEES, UNSPECIFIED CHRONICITY: ICD-10-CM

## 2023-09-21 DIAGNOSIS — M25.561 PAIN IN BOTH KNEES, UNSPECIFIED CHRONICITY: ICD-10-CM

## 2023-09-21 PROCEDURE — 99204 PR OFFICE/OUTPT VISIT, NEW, LEVL IV, 45-59 MIN: ICD-10-PCS | Mod: S$PBB,25,, | Performed by: ORTHOPAEDIC SURGERY

## 2023-09-21 PROCEDURE — 73562 X-RAY EXAM OF KNEE 3: CPT | Mod: TC,50

## 2023-09-21 PROCEDURE — 1159F MED LIST DOCD IN RCRD: CPT | Mod: CPTII,,, | Performed by: ORTHOPAEDIC SURGERY

## 2023-09-21 PROCEDURE — 73562 X-RAY EXAM OF KNEE 3: CPT | Mod: 26,50,, | Performed by: ORTHOPAEDIC SURGERY

## 2023-09-21 PROCEDURE — 73562 XR KNEE 3 VIEW BILATERAL: ICD-10-PCS | Mod: 26,50,, | Performed by: ORTHOPAEDIC SURGERY

## 2023-09-21 PROCEDURE — 20610 DRAIN/INJ JOINT/BURSA W/O US: CPT | Mod: PBBFAC,RT | Performed by: ORTHOPAEDIC SURGERY

## 2023-09-21 PROCEDURE — 1159F PR MEDICATION LIST DOCUMENTED IN MEDICAL RECORD: ICD-10-PCS | Mod: CPTII,,, | Performed by: ORTHOPAEDIC SURGERY

## 2023-09-21 PROCEDURE — 99214 OFFICE O/P EST MOD 30 MIN: CPT | Mod: PBBFAC,25 | Performed by: ORTHOPAEDIC SURGERY

## 2023-09-21 PROCEDURE — 99999PBSHW PR PBB SHADOW TECHNICAL ONLY FILED TO HB: ICD-10-PCS | Mod: PBBFAC,,,

## 2023-09-21 PROCEDURE — 99999PBSHW PR PBB SHADOW TECHNICAL ONLY FILED TO HB: Mod: PBBFAC,,,

## 2023-09-21 PROCEDURE — 20610 LARGE JOINT ASPIRATION/INJECTION: R KNEE: ICD-10-PCS | Mod: S$PBB,RT,, | Performed by: ORTHOPAEDIC SURGERY

## 2023-09-21 PROCEDURE — 99204 OFFICE O/P NEW MOD 45 MIN: CPT | Mod: S$PBB,25,, | Performed by: ORTHOPAEDIC SURGERY

## 2023-09-21 RX ORDER — TRIAMCINOLONE ACETONIDE 40 MG/ML
40 INJECTION, SUSPENSION INTRA-ARTICULAR; INTRAMUSCULAR
Status: DISCONTINUED | OUTPATIENT
Start: 2023-09-21 | End: 2023-09-21 | Stop reason: HOSPADM

## 2023-09-21 RX ORDER — BUPIVACAINE HYDROCHLORIDE 5 MG/ML
3 INJECTION, SOLUTION PERINEURAL
Status: DISCONTINUED | OUTPATIENT
Start: 2023-09-21 | End: 2023-09-21 | Stop reason: HOSPADM

## 2023-09-21 RX ADMIN — BUPIVACAINE HYDROCHLORIDE 3 ML: 5 INJECTION, SOLUTION PERINEURAL at 09:09

## 2023-09-21 RX ADMIN — TRIAMCINOLONE ACETONIDE 40 MG: 400 INJECTION, SUSPENSION INTRA-ARTICULAR; INTRAMUSCULAR at 09:09

## 2023-09-21 NOTE — PROGRESS NOTES
ASSESSMENT:      ICD-10-CM ICD-9-CM   1. Primary osteoarthritis of both knees  M17.0 715.16   2. Pain in both knees, unspecified chronicity  M25.561 719.46    M25.562    3. Carpal tunnel syndrome on both sides  G56.03 354.0       PLAN:     Findings and treatment options were discussed with the patient regarding the diagnosis.   All questions were answered regarding Quiana Bland 's painful knee.      Natural history and expected course discussed. Questions answered. Educational materials distributed. Rest, ice, compression, and elevation (RICE) therapy. Arthrocentesis. See procedure note.  She is not had any injections in the right knee or left knee.  I think she should get tremendous relief from the right knee injection.  Will attempt that today.  Please see the attached procedure note.  Will follow up on an as-needed basis if pain symptoms do not improve could consider Visco injections or PRP injections in the future    Resting night brace and NSAIDs for bilateral carpal tunnel syndrome recommend   There are no Patient Instructions on file for this visit.    IMAGING:   X-Ray Knee 3 View Bilateral    Result Date: 9/21/2023  See Procedure Notes for results. IMPRESSION: Please see Ortho procedure notes for report.  This procedure was auto-finalized by: Virtual Radiologist  Three views left knee three views of the right knee were obtained today.  There does appear to be increased wear seen on the medial tibial plateau more pronounced on left than right this is present bilaterally.  Varus alignment demonstrated.  Moderate tricompartmental osteoarthritis is seen  X-Ray Knee 1 or 2 View Bilateral    Result Date: 8/28/2023  EXAMINATION: XR KNEE 1 OR 2 VIEW BILATERAL CLINICAL HISTORY: Pain in right knee COMPARISON: None TECHNIQUE: Frontal and lateral views of the bilateral knees. FINDINGS: Moderate loss of joint space of the medial compartment bilaterally.  Spurring of the superior pole of patella bilaterally.   No acute fracture or dislocation.     As above. Point of Service: [facility] Electronically signed by: Vlad Barone Date:    08/28/2023 Time:    17:09    US Post Ablation Venous    Result Date: 8/28/2023  EXAMINATION: US POST ABLATION VENOUS CLINICAL HISTORY:]. .  One month status post ablation bilateral great saphenous veins. COMPARISON: None. 07/24/2023 TECHNIQUE: Patient was placed in the supine position.  Compression on compression images were saved for the record.  The bilateral saphenofemoral junctions, common femoral veins and great saphenous veins were visualized interrogated. FINDINGS: The bilateral saphenofemoral junctions and common femoral veins appear to be widely patent with no evidence of EHIT phenomena or thrombosis.  The bilateral great saphenous veins appears to be ablated as expected.     Status post ablation ultrasound with no evidence of recannulization or complication. Electronically signed by: Brandan Babin Date:    08/28/2023 Time:    16:52       CC: Knee pain    78 y.o. Female who presents as a new patient to me for evaluation of  bilateral knee pain.  Right knee is worse than the left.  Complains of significant swelling in the right knee.  Occupation: retired  Pain has been present for about a year off and on.  Injury description patient states she had a fall about a year ago, but did not notice much pain initially after the fall..   Mechanical symptoms, such as clicking, locking, and popping are not present.    Swelling and effusions  are present.   The patient does  describe symptoms of knee instability, such as the knee buckling and the knee giving way.   Symptoms are worsened with activity.  Better with rest. Treatment thus far has included rest, activity modifications, and oral medications.    she  has not had formal physical therapy.  she has not had prior injections injections into the knee.   she has not had previous advanced imaging such as MRI.     Here today to  discuss diagnosis and treatment options.          REVIEW OF SYSTEMS:   Constitution: Negative. Negative for chills, fever and night sweats.    Hematologic/Lymphatic: Negative for bleeding problem. Does not bruise/bleed easily.   Skin: Negative for dry skin, itching and rash.   Musculoskeletal: Negative for falls. Positive for knee pain and muscle weakness.     All other review of symptoms were reviewed and found to be noncontributory.     PAST MEDICAL HISTORY:   Past Medical History:   Diagnosis Date    CHF (congestive heart failure)     DM (diabetes mellitus)     High cholesterol     HTN (hypertension)     Seizures        PAST SURGICAL HISTORY:   Past Surgical History:   Procedure Laterality Date    CARDIAC CATHETERIZATION       SECTION      x 1    gall stones removed      HYSTERECTOMY      RADIOFREQUENCY ABLATION Right 2023    Procedure: Right GSV RF Ablation;  Surgeon: Brandan Babin DO;  Location: [facility];  Service: Peripheral Vascular;  Laterality: Right;    RADIOFREQUENCY ABLATION Left 2023    Procedure: Left RF Ablation;  Surgeon: Brandan Babin DO;  Location: [facility];  Service: Peripheral Vascular;  Laterality: Left;    TUBAL LIGATION      VAGINAL DELIVERY      x 5       FAMILY HISTORY:   Family History   Problem Relation Age of Onset    Heart disease Mother     No Known Problems Father     Hypertension Sister     Hypertension Sister     Hypertension Brother     Diabetes Daughter     Cancer Son     Diabetes Son     No Known Problems Son     No Known Problems Son        SOCIAL HISTORY:   Social History     Socioeconomic History    Marital status:    Tobacco Use    Smoking status: Never    Smokeless tobacco: Never   Substance and Sexual Activity    Alcohol use: Never    Drug use: Yes     Types: Hydrocodone    Sexual activity: Not Currently     Partners: Male     Comment: Spouse        MEDICATIONS:     Current Outpatient Medications:     albuterol 2 mg/5 mL syrup,  take 1/2 (one-half) TEASPOONFUL (2.5mls) BY MOUTH THREE TIMES DAILY, Disp: , Rfl:     albuterol-ipratropium (DUO-NEB) 2.5 mg-0.5 mg/3 mL nebulizer solution, Ipratropium-Albuterol 0.5-2.5 (3) MG/3ML Inhalation Solution QTY: 120 mL Days: 30 Refills: 11  Written: 22 Patient Instructions: USE 1 VIAL IN NEBULIZER 4 TIMES DAILY, Disp: , Rfl:     azelastine (ASTELIN) 137 mcg (0.1 %) nasal spray, 1 spray 2 (two) times daily., Disp: , Rfl:     budesonide (PULMICORT) 0.5 mg/2 mL nebulizer solution, Budesonide 0.5 MG/2ML Inhalation Suspension QTY: 120 mL Days: 30 Refills: 11  Written: 22 Patient Instructions: USE 1 VIAL  IN  NEBULIZER TWICE  DAILY - rinse mouth out after each use, Disp: , Rfl:     chlorhexidine (PERIDEX) 0.12 % solution, SMARTSI.5 Ounce(s) By Mouth Twice Daily, Disp: , Rfl:     cyanocobalamin 1,000 mcg/mL injection, Cyanocobalamin 1000 MCG/ML Injection Solution QTY: 1 mL Days: 0 Refills: 0  Written: 22 Patient Instructions: ONCE MONTHLY, Disp: , Rfl:     dapagliflozin (FARXIGA) 10 mg tablet, Take 1 tablet (10 mg total) by mouth once daily., Disp: 90 tablet, Rfl: 3    fluticasone propionate (FLONASE) 50 mcg/actuation nasal spray, Fluticasone Propionate 50 MCG/ACT Nasal Suspension QTY: 1 gram Days: 30 Refills: 3  Written: 22 Patient Instructions: twice a day 1 spray each nostril, Disp: , Rfl:     glimepiride (AMARYL) 4 MG tablet, , Disp: , Rfl:     isosorbide mononitrate (IMDUR) 30 MG 24 hr tablet, Take 1 tablet (30 mg total) by mouth once daily., Disp: 90 tablet, Rfl: 3    levalbuterol (XOPENEX HFA) 45 mcg/actuation inhaler, Xopenex HFA 45 MCG/ACT Inhalation Aerosol QTY: 0  Days: 0 Refills: 0  Written: 22 Patient Instructions:, Disp: , Rfl:     LINZESS 145 mcg Cap capsule, TAKE ONE CAPSULE BY MOUTH ONCE DAILY FOR constipation, Disp: , Rfl:     losartan (COZAAR) 100 MG tablet, Losartan Potassium 100 MG Oral Tablet QTY: 90 tablet Days: 90 Refills: 3  Written: 22 Patient  Instructions: once a day, Disp: , Rfl:     metFORMIN (GLUCOPHAGE) 500 MG tablet, , Disp: , Rfl:     metoprolol succinate (TOPROL-XL) 100 MG 24 hr tablet, Take 1 tablet (100 mg total) by mouth once daily., Disp: 90 tablet, Rfl: 3    morphine (MS CONTIN) 15 MG 12 hr tablet, Take 15 mg by mouth nightly., Disp: , Rfl:     neomycin-polymyxin-dexamethasone (MAXITROL) 3.5mg/mL-10,000 unit/mL-0.1 % DrpS, 1 drop into right EYE FOUR TIMES DAILY FOR 10 DAYS, Disp: , Rfl:     olopatadine (PATANOL) 0.1 % ophthalmic solution, Pataday 0.1% Ophthalmic Solution QTY: 0  Days: 0 Refills: 0  Written: 08/10/22 Patient Instructions:, Disp: , Rfl:     oxyCODONE-acetaminophen (PERCOCET)  mg per tablet, TAKE ONE TABLET BY MOUTH 2 TO 3 times DAILY AS NEEDED FOR PAIN, Disp: , Rfl:     pantoprazole (PROTONIX) 40 MG tablet, , Disp: , Rfl:     repaglinide (PRANDIN) 1 MG tablet, Repaglinide 1 MG Oral Tablet QTY: 60 tablet Days: 30 Refills: 5  Written: 04/11/22 Patient Instructions: twice a day with food, Disp: , Rfl:     RYMED, DEXCHLORPHENIRAMINE-PE, 2-10 mg Tab, Take 1 tablet by mouth 2 (two) times daily., Disp: , Rfl:     spironolactone-hydrochlorothiazide 25-25mg (ALDACTAZIDE) 25-25 mg Tab, Spironolactone-HCTZ 25-25 MG Oral Tablet QTY: 0 tablet Days: 7 Refills: 0  Written: 11/10/22 Patient Instructions:, Disp: , Rfl:     valsartan (DIOVAN) 160 MG tablet, TAKE ONE TABLET BY MOUTH DAILY (STOP losartan), Disp: , Rfl:     Current Facility-Administered Medications:     BUPivacaine injection 3 mL, 3 mL, , , Juan M Curtis MD, 3 mL at 09/21/23 0930    triamcinolone acetonide injection 40 mg, 40 mg, Intra-articular, , Juan M Curtis MD, 40 mg at 09/21/23 0930    Facility-Administered Medications Ordered in Other Visits:     LIDOcaine-EPINEPHrine 1%-1:100,000 30 mL, LIDOcaine HCL 10 mg/ml (1%) 20 mL, sodium bicarbonate 10 mL in sodium chloride 0.9% 500 mL solution, , MISCELLANEOUS, Once, Brandan Babin, DO    ALLERGIES:   Review of patient's  allergies indicates:  No Known Allergies     PHYSICAL EXAMINATION:    General    Nursing note and vitals reviewed.  Constitutional: She is oriented to person, place, and time. She appears well-developed and well-nourished.   HENT:   Head: Normocephalic and atraumatic.   Nose: Nose normal.   Eyes: EOM are normal. Pupils are equal, round, and reactive to light.   Neck: Neck supple.   Cardiovascular:  Normal rate, regular rhythm and intact distal pulses.            Pulmonary/Chest: Effort normal. No respiratory distress. She exhibits no tenderness.   Abdominal: Soft. She exhibits no distension. There is no abdominal tenderness. There is no guarding.   Neurological: She is alert and oriented to person, place, and time. She has normal reflexes.   Psychiatric: She has a normal mood and affect. Her behavior is normal. Judgment and thought content normal.           Right Knee Exam     Inspection   Swelling: present  Deformity: absent    Tenderness   The patient is tender to palpation of the medial retinaculum and medial joint line.    Crepitus   The patient has crepitus of the medial joint line and medial plica.    Range of Motion   Flexion:  abnormal     Tests   Meniscus   Raleigh:  Medial - positive   Ligament Examination   Lachman: normal (-1 to 2mm)   MCL - Valgus: normal (0 to 2mm)  LCL - Varus: normal  Dial Test at 30 degrees: normal (< 5 degrees)  Posterolateral Corner: unstable (>15 degrees difference)  Patella   Patellar apprehension: negative  Patellar Grind: positive    Other   Sensation: normal    Comments:  Pain with Thessaly Maneuver    Left Knee Exam   Left knee exam is normal.    Inspection   Erythema: absent  Swelling: absent  Bruising: absent    Tenderness   The patient tender to palpation of the patella.    Crepitus   The patient has crepitus of the patella.    Range of Motion   Extension:  normal   Flexion:  normal     Tests   Meniscus   Raleigh:  Medial - positive   Stability   Lachman: normal (-1 to  2mm)   Patella   Passive Patellar Tilt: lateral tilt  Patellar Tracking: normal  Q-Angle at 90 degrees: normal  Patellar Grind: positive    Other   Sensation: normal    Right Hand/Wrist Exam     Inspection   Scars: Wrist - absent   Effusion: Wrist - absent   Bruising: Wrist - absent   Deformity: Wrist - deformity     Tests   Phalens sign: positive  Tinel's sign (median nerve): positive  Finkelstein's test: negative  Carpal Tunnel Compression Test: positive    Atrophy   Thenar:  negative  Intrinsic:  negative    Other     Neuorologic Exam    Median Distribution: abnormal  Ulnar Distribution: normal  Radial Distribution: normal      Left Hand/Wrist Exam     Inspection   Scars: Wrist - absent Hand -  absent  Effusion:  Hand -  absent  Bruising: Wrist - absent Hand -  absent  Deformity: Wrist - absent Hand -  absent    Range of Motion     Wrist   Extension:  normal   Flexion:  normal   Pronation:  normal   Supination:  normal     Tests   Phalens sign: positive  Tinel's sign (median nerve): positive  Carpal Tunnel Compression Test: positive    Atrophy  Thenar:  positive  Hypothenar:  negative    Other     Sensory Exam  Median Distribution: abnormal          Muscle Strength   Right Upper Extremity   Wrist extension: 5/5   Wrist flexion: 5/5   : 4/5   Left Upper Extremity  Wrist extension: 5/5   Wrist flexion: 5/5   :  4/5   Index Finger: 4/5  Middle Finger: 4/5  Ring Finger: 4/5  Right Lower Extremity   Quadriceps:  5/5   Hamstrin/5     Reflexes     Right Side   Achilles:  2+  Quadriceps:  2+    Vascular Exam     Right Pulses  Dorsalis Pedis:      2+  Posterior Tibial:      2+        Left Pulses  Dorsalis Pedis:      2+  Posterior Tibial:      2+        Capillary Refill  Right Hand: normal capillary refill  Left Oz's Test: rapid refill            Orders Placed This Encounter   Procedures    Large Joint Aspiration/Injection: R knee     This order was created via procedure documentation       Large Joint  Aspiration/Injection: R knee    Date/Time: 9/21/2023 9:30 AM    Performed by: Juan M Curtis MD  Authorized by: Juan M Curtis MD    Consent Done?:  Yes (Verbal)  Indications:  Pain  Local anesthesia used?: No      Details:  Needle Size:  22 G  Approach:  Superior  Location:  Knee  Site:  R knee  Medications:  3 mL BUPivacaine 0.5 % (5 mg/mL); 40 mg triamcinolone acetonide 40 mg/mL  Patient tolerance:  Patient tolerated the procedure well with no immediate complications

## 2023-10-12 ENCOUNTER — ANESTHESIA EVENT (OUTPATIENT)
Dept: GASTROENTEROLOGY | Facility: HOSPITAL | Age: 78
End: 2023-10-12
Payer: MEDICARE

## 2023-10-12 ENCOUNTER — ANESTHESIA (OUTPATIENT)
Dept: GASTROENTEROLOGY | Facility: HOSPITAL | Age: 78
End: 2023-10-12
Payer: MEDICARE

## 2023-10-12 ENCOUNTER — HOSPITAL ENCOUNTER (OUTPATIENT)
Dept: GASTROENTEROLOGY | Facility: HOSPITAL | Age: 78
Discharge: HOME | End: 2023-10-12
Attending: INTERNAL MEDICINE
Payer: MEDICARE

## 2023-10-12 VITALS
RESPIRATION RATE: 19 BRPM | TEMPERATURE: 98 F | SYSTOLIC BLOOD PRESSURE: 105 MMHG | OXYGEN SATURATION: 87 % | HEART RATE: 75 BPM | DIASTOLIC BLOOD PRESSURE: 78 MMHG

## 2023-10-12 DIAGNOSIS — R10.32 LLQ ABDOMINAL PAIN: ICD-10-CM

## 2023-10-12 DIAGNOSIS — Z12.11 COLON CANCER SCREENING: ICD-10-CM

## 2023-10-12 LAB — GLUCOSE SERPL-MCNC: 119 MG/DL (ref 70–105)

## 2023-10-12 PROCEDURE — D9220A PRA ANESTHESIA: Mod: PT,,,

## 2023-10-12 PROCEDURE — 88305 TISSUE EXAM BY PATHOLOGIST: CPT | Mod: TC,SUR | Performed by: INTERNAL MEDICINE

## 2023-10-12 PROCEDURE — 45385 PR COLONOSCOPY,REMV LESN,SNARE: ICD-10-PCS | Mod: PT,,, | Performed by: INTERNAL MEDICINE

## 2023-10-12 PROCEDURE — 88305 SURGICAL PATHOLOGY: ICD-10-PCS | Mod: 26,,, | Performed by: PATHOLOGY

## 2023-10-12 PROCEDURE — 82962 GLUCOSE BLOOD TEST: CPT

## 2023-10-12 PROCEDURE — 25000003 PHARM REV CODE 250

## 2023-10-12 PROCEDURE — D9220A PRA ANESTHESIA: ICD-10-PCS | Mod: PT,,,

## 2023-10-12 PROCEDURE — 45385 COLONOSCOPY W/LESION REMOVAL: CPT | Mod: PT,,, | Performed by: INTERNAL MEDICINE

## 2023-10-12 PROCEDURE — 37000008 HC ANESTHESIA 1ST 15 MINUTES

## 2023-10-12 PROCEDURE — 63600175 PHARM REV CODE 636 W HCPCS

## 2023-10-12 PROCEDURE — 88305 TISSUE EXAM BY PATHOLOGIST: CPT | Mod: 26,,, | Performed by: PATHOLOGY

## 2023-10-12 PROCEDURE — 45380 PR COLONOSCOPY,BIOPSY: ICD-10-PCS | Mod: 59,PT,, | Performed by: INTERNAL MEDICINE

## 2023-10-12 PROCEDURE — 45385 COLONOSCOPY W/LESION REMOVAL: CPT | Mod: PT | Performed by: INTERNAL MEDICINE

## 2023-10-12 PROCEDURE — 45380 COLONOSCOPY AND BIOPSY: CPT | Mod: 59,PT | Performed by: INTERNAL MEDICINE

## 2023-10-12 PROCEDURE — 27201423 OPTIME MED/SURG SUP & DEVICES STERILE SUPPLY

## 2023-10-12 PROCEDURE — 37000009 HC ANESTHESIA EA ADD 15 MINS

## 2023-10-12 PROCEDURE — 45380 COLONOSCOPY AND BIOPSY: CPT | Mod: 59,PT,, | Performed by: INTERNAL MEDICINE

## 2023-10-12 RX ORDER — SODIUM CHLORIDE 9 MG/ML
INJECTION, SOLUTION INTRAVENOUS CONTINUOUS PRN
Status: DISCONTINUED | OUTPATIENT
Start: 2023-10-12 | End: 2023-10-12

## 2023-10-12 RX ORDER — PROPOFOL 10 MG/ML
VIAL (ML) INTRAVENOUS
Status: DISCONTINUED | OUTPATIENT
Start: 2023-10-12 | End: 2023-10-12

## 2023-10-12 RX ORDER — SODIUM CHLORIDE 0.9 % (FLUSH) 0.9 %
10 SYRINGE (ML) INJECTION
Status: DISCONTINUED | OUTPATIENT
Start: 2023-10-12 | End: 2023-10-13 | Stop reason: HOSPADM

## 2023-10-12 RX ORDER — LIDOCAINE HYDROCHLORIDE 20 MG/ML
INJECTION, SOLUTION EPIDURAL; INFILTRATION; INTRACAUDAL; PERINEURAL
Status: DISCONTINUED | OUTPATIENT
Start: 2023-10-12 | End: 2023-10-12

## 2023-10-12 RX ADMIN — PROPOFOL 50 MG: 10 INJECTION, EMULSION INTRAVENOUS at 10:10

## 2023-10-12 RX ADMIN — PROPOFOL 30 MG: 10 INJECTION, EMULSION INTRAVENOUS at 10:10

## 2023-10-12 RX ADMIN — LIDOCAINE HYDROCHLORIDE 100 MG: 20 INJECTION, SOLUTION INTRAVENOUS at 10:10

## 2023-10-12 RX ADMIN — SODIUM CHLORIDE: 9 INJECTION, SOLUTION INTRAVENOUS at 10:10

## 2023-10-12 NOTE — ANESTHESIA PREPROCEDURE EVALUATION
10/12/2023  Quiana Bland is a 78 y.o., female.      Pre-op Assessment    I have reviewed the Patient Summary Reports.     I have reviewed the Nursing Notes. I have reviewed the NPO Status.   I have reviewed the Medications.     Review of Systems  Anesthesia Hx:  No problems with previous Anesthesia    Social:  Non-Smoker, No Alcohol Use    Hematology/Oncology:  Hematology Normal   Oncology Normal     EENT/Dental:EENT/Dental Normal   Cardiovascular:   Hypertension CHF    Pulmonary:  Pulmonary Normal    Renal/:  Renal/ Normal     Hepatic/GI:  Hepatic/GI Normal    Musculoskeletal:  Musculoskeletal Normal    Neurological:   Seizures    Endocrine:   Diabetes  Obesity / BMI > 30  Dermatological:  Skin Normal    Psych:  Psychiatric Normal           Physical Exam  General: Well nourished, Cooperative, Alert and Oriented    Airway:  Mallampati: II   Mouth Opening: Normal  TM Distance: Normal  Tongue: Normal  Neck ROM: Normal ROM    Dental:  Intact    Chest/Lungs:  Clear to auscultation, Normal Respiratory Rate    Heart:  Rate: Normal  Rhythm: Regular Rhythm  Sounds: Normal    Abdomen:  Normal, Soft, Nontender    advanced age    Anesthesia Plan  Type of Anesthesia, risks & benefits discussed:    Anesthesia Type: Gen Natural Airway, MAC  Intra-op Monitoring Plan: Standard ASA Monitors  Post Op Pain Control Plan: multimodal analgesia and IV/PO Opioids PRN  Induction:  IV  Informed Consent: Informed consent signed with the Patient and all parties understand the risks and agree with anesthesia plan.  All questions answered.   ASA Score: 3  Day of Surgery Review of History & Physical: I have interviewed and examined the patient. I have reviewed the patient's H&P dated:     Ready For Surgery From Anesthesia Perspective.     .

## 2023-10-12 NOTE — DISCHARGE INSTRUCTIONS
Procedure Date  10/12/23     Impression  Overall Impression:   3 subcentimeter polyps in the ascending colon and descending colon were removed with cold forceps biopsy and cold snare  Scattered diverticulosis of moderate severity in the transverse colon, descending colon and sigmoid colon  The terminal ileum, ileocecal valve and cecum appeared normal.     Recommendation    Await pathology results     Repeat colonoscopy in 3 years given history of HRA with EMR at [facility] 3 years ago and number of polyps today  No tattoo sites or obvious EMR/ESD sites found on today's exam      Outcome of procedure: successful Colonoscopy  Disposition: patient to recovery following procedure; discharge to home when appropriate parameters met  Provisions for follow up: please call my office for any unexpected symptoms like chest or abdominal pain or bleeding following your procedure.    No driving today, no operating heavy machinery, no signing any legal documents until tomorrow.    Drink lots of fluids, resume regular diet.  Take your normal medications.

## 2023-10-12 NOTE — TRANSFER OF CARE
Anesthesia Transfer of Care Note    Patient: Quiana Bland    Procedure(s) Performed: colonoscopy    Patient location: GI    Anesthesia Type: general and MAC    Transport from OR: Transported from OR on room air with adequate spontaneous ventilation    Post pain: adequate analgesia    Post assessment: no apparent anesthetic complications    Post vital signs: stable    Level of consciousness: awake, alert and oriented    Nausea/Vomiting: no nausea/vomiting    Complications: none    Transfer of care protocol was followedComments: Refer to nursing note for pain killian score upon discharge from recovery      Last vitals:   Visit Vitals  /75   Pulse 69   Temp 36.4 °C (97.6 °F)   Resp 18   SpO2 98%   Breastfeeding No

## 2023-10-12 NOTE — ANESTHESIA POSTPROCEDURE EVALUATION
Anesthesia Post Evaluation    Patient: Quiana Bland    Procedure(s) Performed: colonoscopy    Final Anesthesia Type: general      Patient location during evaluation: GI PACU  Patient participation: Yes- Able to Participate  Level of consciousness: awake and alert  Post-procedure vital signs: reviewed and stable  Pain management: adequate  Airway patency: patent    PONV status at discharge: No PONV  Anesthetic complications: no      Cardiovascular status: blood pressure returned to baseline  Respiratory status: unassisted and spontaneous ventilation  Hydration status: euvolemic  Follow-up not needed.  Comments: Refer to nursing note for pain and killian score upon discharge from recovery          Vitals Value Taken Time   /75 10/12/23 1049   Temp 36.4 °C (97.6 °F) 10/12/23 1044   Pulse 70 10/12/23 1058   Resp 15 10/12/23 1058   SpO2 95 % 10/12/23 1058   Vitals shown include unvalidated device data.      No case tracking events are documented in the log.      Pain/Killian Score: Killian Score: 9 (10/12/2023 10:50 AM)

## 2023-10-12 NOTE — H&P
Gastroenterology Pre-procedure H&P    Chief Complaint: Colon cancer screening    History of Present Illness    Quiana Bland is a 78 y.o. female that  has a past medical history of CHF (congestive heart failure), DM (diabetes mellitus), High cholesterol, HTN (hypertension), and Seizures.     Patient here for routine surveillance and LLQ abdominal pain. Had advanced EMR at [facility] 3 years ago and lost to follow up.    Patient denies wt loss, abdominal pain, diarrhea, melena/hematochezia, change in stool caliber, no anticoagulants, FMHx of GI related malignancies.      Past Medical History:   Diagnosis Date    CHF (congestive heart failure)     DM (diabetes mellitus)     High cholesterol     HTN (hypertension)     Seizures        Past Surgical History:   Procedure Laterality Date    CARDIAC CATHETERIZATION       SECTION      x 1    gall stones removed      HYSTERECTOMY      RADIOFREQUENCY ABLATION Right 2023    Procedure: Right GSV RF Ablation;  Surgeon: Brandan Babin DO;  Location: [facility];  Service: Peripheral Vascular;  Laterality: Right;    RADIOFREQUENCY ABLATION Left 2023    Procedure: Left RF Ablation;  Surgeon: Brandan Babin DO;  Location: [facility] OR;  Service: Peripheral Vascular;  Laterality: Left;    TUBAL LIGATION      VAGINAL DELIVERY      x 5       Family History   Problem Relation Age of Onset    Heart disease Mother     No Known Problems Father     Hypertension Sister     Hypertension Sister     Hypertension Brother     Diabetes Daughter     Cancer Son     Diabetes Son     No Known Problems Son     No Known Problems Son        Social History     Socioeconomic History    Marital status:    Tobacco Use    Smoking status: Never    Smokeless tobacco: Never   Substance and Sexual Activity    Alcohol use: Never    Drug use: Yes     Types: Hydrocodone    Sexual activity: Not Currently     Partners: Male     Comment: Spouse        Current Outpatient Medications    Medication Sig Dispense Refill    albuterol 2 mg/5 mL syrup take 1/2 (one-half) TEASPOONFUL (2.5mls) BY MOUTH THREE TIMES DAILY      albuterol-ipratropium (DUO-NEB) 2.5 mg-0.5 mg/3 mL nebulizer solution Ipratropium-Albuterol 0.5-2.5 (3) MG/3ML Inhalation Solution QTY: 120 mL Days: 30 Refills: 11  Written: 22 Patient Instructions: USE 1 VIAL IN NEBULIZER 4 TIMES DAILY      azelastine (ASTELIN) 137 mcg (0.1 %) nasal spray 1 spray 2 (two) times daily.      budesonide (PULMICORT) 0.5 mg/2 mL nebulizer solution Budesonide 0.5 MG/2ML Inhalation Suspension QTY: 120 mL Days: 30 Refills: 11  Written: 22 Patient Instructions: USE 1 VIAL  IN  NEBULIZER TWICE  DAILY - rinse mouth out after each use      chlorhexidine (PERIDEX) 0.12 % solution SMARTSI.5 Ounce(s) By Mouth Twice Daily      cyanocobalamin 1,000 mcg/mL injection Cyanocobalamin 1000 MCG/ML Injection Solution QTY: 1 mL Days: 0 Refills: 0  Written: 22 Patient Instructions: ONCE MONTHLY      dapagliflozin (FARXIGA) 10 mg tablet Take 1 tablet (10 mg total) by mouth once daily. 90 tablet 3    fluticasone propionate (FLONASE) 50 mcg/actuation nasal spray Fluticasone Propionate 50 MCG/ACT Nasal Suspension QTY: 1 gram Days: 30 Refills: 3  Written: 22 Patient Instructions: twice a day 1 spray each nostril      glimepiride (AMARYL) 4 MG tablet       isosorbide mononitrate (IMDUR) 30 MG 24 hr tablet Take 1 tablet (30 mg total) by mouth once daily. 90 tablet 3    levalbuterol (XOPENEX HFA) 45 mcg/actuation inhaler Xopenex HFA 45 MCG/ACT Inhalation Aerosol QTY: 0  Days: 0 Refills: 0  Written: 22 Patient Instructions:      LINZESS 145 mcg Cap capsule TAKE ONE CAPSULE BY MOUTH ONCE DAILY FOR constipation      losartan (COZAAR) 100 MG tablet Losartan Potassium 100 MG Oral Tablet QTY: 90 tablet Days: 90 Refills: 3  Written: 22 Patient Instructions: once a day      metFORMIN (GLUCOPHAGE) 500 MG tablet       metoprolol succinate (TOPROL-XL) 100  MG 24 hr tablet Take 1 tablet (100 mg total) by mouth once daily. 90 tablet 3    morphine (MS CONTIN) 15 MG 12 hr tablet Take 15 mg by mouth nightly.      neomycin-polymyxin-dexamethasone (MAXITROL) 3.5mg/mL-10,000 unit/mL-0.1 % DrpS 1 drop into right EYE FOUR TIMES DAILY FOR 10 DAYS      olopatadine (PATANOL) 0.1 % ophthalmic solution Pataday 0.1% Ophthalmic Solution QTY: 0  Days: 0 Refills: 0  Written: 08/10/22 Patient Instructions:      oxyCODONE-acetaminophen (PERCOCET)  mg per tablet TAKE ONE TABLET BY MOUTH 2 TO 3 times DAILY AS NEEDED FOR PAIN      pantoprazole (PROTONIX) 40 MG tablet       repaglinide (PRANDIN) 1 MG tablet Repaglinide 1 MG Oral Tablet QTY: 60 tablet Days: 30 Refills: 5  Written: 04/11/22 Patient Instructions: twice a day with food      RYMED, DEXCHLORPHENIRAMINE-PE, 2-10 mg Tab Take 1 tablet by mouth 2 (two) times daily.      spironolactone-hydrochlorothiazide 25-25mg (ALDACTAZIDE) 25-25 mg Tab Spironolactone-HCTZ 25-25 MG Oral Tablet QTY: 0 tablet Days: 7 Refills: 0  Written: 11/10/22 Patient Instructions:      valsartan (DIOVAN) 160 MG tablet TAKE ONE TABLET BY MOUTH DAILY (STOP losartan)       Current Facility-Administered Medications   Medication Dose Route Frequency Provider Last Rate Last Admin    sodium chloride 0.9% flush 10 mL  10 mL Intravenous PRN Ace Pereira MD         Facility-Administered Medications Ordered in Other Encounters   Medication Dose Route Frequency Provider Last Rate Last Admin    0.9%  NaCl infusion   Intravenous Continuous PRN Bo Tolentino CRNA 50 mL/hr at 10/12/23 1004 New Bag at 10/12/23 1004    LIDOcaine-EPINEPHrine 1%-1:100,000 30 mL, LIDOcaine HCL 10 mg/ml (1%) 20 mL, sodium bicarbonate 10 mL in sodium chloride 0.9% 500 mL solution   MISCELLANEOUS Once Brandan aBbin DO           Review of patient's allergies indicates:  No Known Allergies    Objective:  Vitals:    10/12/23 0940   BP: (!) 172/85   Pulse: 68   Resp: (!) 23   SpO2: 96%         GEN: normal appearing, NAD, AAO x3  HENT: NCAT, anicteric, OP benign  CV: normal rate, regular rhythm  RESP: NABS, symmetric rise, unlabored  ABD: soft, ND, no guarding or TTP  SKIN: warm and dry  NEURO: grossly afocal    Assessment and Plan:    Proceed with:    Colonoscopy for previous adenomatous polyp, screening for colon cancer    Ace Pereira MD  Gastroenterology

## 2023-10-31 ENCOUNTER — OFFICE VISIT (OUTPATIENT)
Dept: VASCULAR SURGERY | Facility: CLINIC | Age: 78
End: 2023-10-31
Payer: MEDICARE

## 2023-10-31 ENCOUNTER — HOSPITAL ENCOUNTER (OUTPATIENT)
Dept: RADIOLOGY | Facility: HOSPITAL | Age: 78
Discharge: HOME | End: 2023-10-31
Attending: FAMILY MEDICINE
Payer: MEDICARE

## 2023-10-31 VITALS
DIASTOLIC BLOOD PRESSURE: 70 MMHG | HEIGHT: 62 IN | RESPIRATION RATE: 18 BRPM | HEART RATE: 68 BPM | SYSTOLIC BLOOD PRESSURE: 148 MMHG | BODY MASS INDEX: 38.94 KG/M2 | WEIGHT: 211.63 LBS

## 2023-10-31 DIAGNOSIS — M79.604 LEG PAIN, BILATERAL: Primary | ICD-10-CM

## 2023-10-31 DIAGNOSIS — R60.0 EDEMA, LOWER EXTREMITY: ICD-10-CM

## 2023-10-31 DIAGNOSIS — I87.2 VENOUS INSUFFICIENCY: ICD-10-CM

## 2023-10-31 DIAGNOSIS — M79.605 LEG PAIN, BILATERAL: Primary | ICD-10-CM

## 2023-10-31 DIAGNOSIS — L81.9 HYPERPIGMENTATION OF SKIN: ICD-10-CM

## 2023-10-31 PROCEDURE — 1159F PR MEDICATION LIST DOCUMENTED IN MEDICAL RECORD: ICD-10-PCS | Mod: CPTII,,, | Performed by: FAMILY MEDICINE

## 2023-10-31 PROCEDURE — 1125F AMNT PAIN NOTED PAIN PRSNT: CPT | Mod: CPTII,,, | Performed by: FAMILY MEDICINE

## 2023-10-31 PROCEDURE — 3288F PR FALLS RISK ASSESSMENT DOCUMENTED: ICD-10-PCS | Mod: CPTII,,, | Performed by: FAMILY MEDICINE

## 2023-10-31 PROCEDURE — 99215 OFFICE O/P EST HI 40 MIN: CPT | Mod: PBBFAC,25 | Performed by: FAMILY MEDICINE

## 2023-10-31 PROCEDURE — 1159F MED LIST DOCD IN RCRD: CPT | Mod: CPTII,,, | Performed by: FAMILY MEDICINE

## 2023-10-31 PROCEDURE — 99214 OFFICE O/P EST MOD 30 MIN: CPT | Mod: S$PBB,,, | Performed by: FAMILY MEDICINE

## 2023-10-31 PROCEDURE — 3077F PR MOST RECENT SYSTOLIC BLOOD PRESSURE >= 140 MM HG: ICD-10-PCS | Mod: CPTII,,, | Performed by: FAMILY MEDICINE

## 2023-10-31 PROCEDURE — 93971 EXTREMITY STUDY: CPT | Mod: 26,,, | Performed by: FAMILY MEDICINE

## 2023-10-31 PROCEDURE — 1101F PT FALLS ASSESS-DOCD LE1/YR: CPT | Mod: CPTII,,, | Performed by: FAMILY MEDICINE

## 2023-10-31 PROCEDURE — 1160F RVW MEDS BY RX/DR IN RCRD: CPT | Mod: CPTII,,, | Performed by: FAMILY MEDICINE

## 2023-10-31 PROCEDURE — 1160F PR REVIEW ALL MEDS BY PRESCRIBER/CLIN PHARMACIST DOCUMENTED: ICD-10-PCS | Mod: CPTII,,, | Performed by: FAMILY MEDICINE

## 2023-10-31 PROCEDURE — 99214 PR OFFICE/OUTPT VISIT, EST, LEVL IV, 30-39 MIN: ICD-10-PCS | Mod: S$PBB,,, | Performed by: FAMILY MEDICINE

## 2023-10-31 PROCEDURE — 1125F PR PAIN SEVERITY QUANTIFIED, PAIN PRESENT: ICD-10-PCS | Mod: CPTII,,, | Performed by: FAMILY MEDICINE

## 2023-10-31 PROCEDURE — 3077F SYST BP >= 140 MM HG: CPT | Mod: CPTII,,, | Performed by: FAMILY MEDICINE

## 2023-10-31 PROCEDURE — 1101F PR PT FALLS ASSESS DOC 0-1 FALLS W/OUT INJ PAST YR: ICD-10-PCS | Mod: CPTII,,, | Performed by: FAMILY MEDICINE

## 2023-10-31 PROCEDURE — 3288F FALL RISK ASSESSMENT DOCD: CPT | Mod: CPTII,,, | Performed by: FAMILY MEDICINE

## 2023-10-31 PROCEDURE — 3078F DIAST BP <80 MM HG: CPT | Mod: CPTII,,, | Performed by: FAMILY MEDICINE

## 2023-10-31 PROCEDURE — 93971 US POST ABLATION VENOUS: ICD-10-PCS | Mod: 26,,, | Performed by: FAMILY MEDICINE

## 2023-10-31 PROCEDURE — 3078F PR MOST RECENT DIASTOLIC BLOOD PRESSURE < 80 MM HG: ICD-10-PCS | Mod: CPTII,,, | Performed by: FAMILY MEDICINE

## 2023-10-31 PROCEDURE — 93971 EXTREMITY STUDY: CPT | Mod: TC

## 2023-10-31 NOTE — PROGRESS NOTES
VEIN CENTER CLINIC NOTE    Patient ID: Quiana Bland is a 78 y.o. female.    I. HISTORY     Chief Complaint:   Chief Complaint   Patient presents with    Follow-up     Exam room 3.  3M s/p bilateral GSV RF Ablation. C/O inner thigh and leg pain.        HPI: Quiana Bland is a 78 y.o. female who presents today for a three-month follow-up after undergoing bilateral great saphenous vein radiofrequency ablation.  Patient's post ablation ultrasound today shows no evidence of heat induced thrombus and well ablated bilateral great saphenous veins.  She states that she has noticed less swelling and tightness in her lower extremities, however she states she continues to have some increasing leg cramps and a crawling type feeling of her medial lower legs.  She states these symptoms persist despite wearing her thigh-high compression daily.    Clinical Summary:  Patient initially seen on 02/28/2023 by referral from Dr Ramírez, cardiology, for evaluation of bilateral lower extremity leg swelling and discomfort.  Symptoms are progressive/worsening and began a proximally 2 years ago.  Location is bilateral lower extremities below the knees. Symptoms are worse at the end of the day.  History of venous interventions includes none.  Unknown family history of venous disease.  Patient also sees Dr. Lozoya, pulmonology, and has a history of obstructive sleep apnea and uses a CPAP nightly.  She also sleeps in a chair secondary to orthopnea.  Also complains of right knee pain and swelling and limited ambulation throughout the day.  Recent negative BNP.  Echocardiogram pending.    Bilateral complete venous reflux study 03/08/2023, shows no evidence of DVT bilaterally.  The study also shows dilation and axial reflux of the bilateral great saphenous veins.    Venous Disease Medical Necessity Documentation Initial Visit Date:  02/28/2023 Return Check Date: 06/08/2023   Have you ever had a rupture or bleed from a varicose vein in  your leg(s)?              [] Yes  [x] No   [] Yes   [x] No   Have you ever been diagnosed with phlebitis, cellulitis, or inflammation in the area of the varicose veins of  your leg(s)?  [] Yes  [x] No    [] Yes   [x] No   Do you have darkened or inflamed skin on your legs?   [] Yes   [x] No   [] Yes   [x] No   Do you have leg swelling?     [x] Yes   [] No   [x] Yes   [] No   Do you have leg pain?   [x] Yes   [] No   [x] Yes   [] No   If yes, describe the type of pain?    [x]   Stabbing [x]  Radiating [x]  Aching   [x]  Tightness [x]  Throbbing               [x]  Burning [x]  Cramping          No change    Do you have leg discomfort?   [x] Yes   [] No   [x] Yes   [] No   If yes, describe the type of discomfort?    [x]  Heaviness [x]  Fullness   [x]  Restlessness [x] Tired/Fatigued [x] Itching          No change except now no itching    Have you ever worn compression hose?   [x] Yes   [] No   [x] Yes   [] No   If yes, how long?    18 months    3 months   Do you elevate your legs while sitting?   [x] Yes   [] No   [x] Yes   [] No   Does venous disease (varicose veins, ulcers, skin changes, leg pain/swelling) interfere with your daily life?  If yes, check activities you are limited or unable to do.    [x]  Shower  [x]   Walk  []  Exercise  [x] Play with children/grandchildren  [x]  Shop [] Work [x] Stand for any period of time [x] Sleep                               [x] Sitting for an extended period of time.           [x] Yes   [] No   [x] Yes   [] No   No change   Do you exercise/have you tried to exercise (i.e.  Walk our participate in a regular exercise routine)?  [] Yes  [x] No   [] Yes   [x] No   BMI 41.8   38.5          Past Medical History:   Diagnosis Date    CHF (congestive heart failure)     DM (diabetes mellitus)     High cholesterol     HTN (hypertension)     Seizures         Past Surgical History:   Procedure Laterality Date    CARDIAC CATHETERIZATION       SECTION      x 1    gall stones removed       HYSTERECTOMY      RADIOFREQUENCY ABLATION Right 2023    Procedure: Right GSV RF Ablation;  Surgeon: Brandan Babin DO;  Location: [facility] OR;  Service: Peripheral Vascular;  Laterality: Right;    RADIOFREQUENCY ABLATION Left 2023    Procedure: Left RF Ablation;  Surgeon: Brandan Babin DO;  Location: [facility] OR;  Service: Peripheral Vascular;  Laterality: Left;    TUBAL LIGATION      VAGINAL DELIVERY      x 5       Social History     Tobacco Use   Smoking Status Never   Smokeless Tobacco Never         Current Outpatient Medications:     albuterol 2 mg/5 mL syrup, take 1/2 (one-half) TEASPOONFUL (2.5mls) BY MOUTH THREE TIMES DAILY, Disp: , Rfl:     albuterol-ipratropium (DUO-NEB) 2.5 mg-0.5 mg/3 mL nebulizer solution, Ipratropium-Albuterol 0.5-2.5 (3) MG/3ML Inhalation Solution QTY: 120 mL Days: 30 Refills: 11  Written: 22 Patient Instructions: USE 1 VIAL IN NEBULIZER 4 TIMES DAILY, Disp: , Rfl:     azelastine (ASTELIN) 137 mcg (0.1 %) nasal spray, 1 spray 2 (two) times daily., Disp: , Rfl:     budesonide (PULMICORT) 0.5 mg/2 mL nebulizer solution, Budesonide 0.5 MG/2ML Inhalation Suspension QTY: 120 mL Days: 30 Refills: 11  Written: 22 Patient Instructions: USE 1 VIAL  IN  NEBULIZER TWICE  DAILY - rinse mouth out after each use, Disp: , Rfl:     chlorhexidine (PERIDEX) 0.12 % solution, SMARTSI.5 Ounce(s) By Mouth Twice Daily, Disp: , Rfl:     cyanocobalamin 1,000 mcg/mL injection, Cyanocobalamin 1000 MCG/ML Injection Solution QTY: 1 mL Days: 0 Refills: 0  Written: 22 Patient Instructions: ONCE MONTHLY, Disp: , Rfl:     dapagliflozin (FARXIGA) 10 mg tablet, Take 1 tablet (10 mg total) by mouth once daily., Disp: 90 tablet, Rfl: 3    fluticasone propionate (FLONASE) 50 mcg/actuation nasal spray, Fluticasone Propionate 50 MCG/ACT Nasal Suspension QTY: 1 gram Days: 30 Refills: 3  Written: 22 Patient Instructions: twice a day 1 spray each nostril, Disp: , Rfl:      glimepiride (AMARYL) 4 MG tablet, , Disp: , Rfl:     isosorbide mononitrate (IMDUR) 30 MG 24 hr tablet, Take 1 tablet (30 mg total) by mouth once daily., Disp: 90 tablet, Rfl: 3    levalbuterol (XOPENEX HFA) 45 mcg/actuation inhaler, Xopenex HFA 45 MCG/ACT Inhalation Aerosol QTY: 0  Days: 0 Refills: 0  Written: 07/07/22 Patient Instructions:, Disp: , Rfl:     LINZESS 145 mcg Cap capsule, TAKE ONE CAPSULE BY MOUTH ONCE DAILY FOR constipation, Disp: , Rfl:     losartan (COZAAR) 100 MG tablet, Losartan Potassium 100 MG Oral Tablet QTY: 90 tablet Days: 90 Refills: 3  Written: 08/03/22 Patient Instructions: once a day, Disp: , Rfl:     metFORMIN (GLUCOPHAGE) 500 MG tablet, , Disp: , Rfl:     metoprolol succinate (TOPROL-XL) 100 MG 24 hr tablet, Take 1 tablet (100 mg total) by mouth once daily., Disp: 90 tablet, Rfl: 3    morphine (MS CONTIN) 15 MG 12 hr tablet, Take 15 mg by mouth nightly., Disp: , Rfl:     neomycin-polymyxin-dexamethasone (MAXITROL) 3.5mg/mL-10,000 unit/mL-0.1 % DrpS, 1 drop into right EYE FOUR TIMES DAILY FOR 10 DAYS, Disp: , Rfl:     olopatadine (PATANOL) 0.1 % ophthalmic solution, Pataday 0.1% Ophthalmic Solution QTY: 0  Days: 0 Refills: 0  Written: 08/10/22 Patient Instructions:, Disp: , Rfl:     oxyCODONE-acetaminophen (PERCOCET)  mg per tablet, TAKE ONE TABLET BY MOUTH 2 TO 3 times DAILY AS NEEDED FOR PAIN, Disp: , Rfl:     pantoprazole (PROTONIX) 40 MG tablet, , Disp: , Rfl:     repaglinide (PRANDIN) 1 MG tablet, Repaglinide 1 MG Oral Tablet QTY: 60 tablet Days: 30 Refills: 5  Written: 04/11/22 Patient Instructions: twice a day with food, Disp: , Rfl:     RYMED, DEXCHLORPHENIRAMINE-PE, 2-10 mg Tab, Take 1 tablet by mouth 2 (two) times daily., Disp: , Rfl:     spironolactone-hydrochlorothiazide 25-25mg (ALDACTAZIDE) 25-25 mg Tab, Spironolactone-HCTZ 25-25 MG Oral Tablet QTY: 0 tablet Days: 7 Refills: 0  Written: 11/10/22 Patient Instructions:, Disp: , Rfl:     valsartan (DIOVAN) 160 MG  tablet, TAKE ONE TABLET BY MOUTH DAILY (STOP losartan), Disp: , Rfl:   No current facility-administered medications for this visit.    Facility-Administered Medications Ordered in Other Visits:     LIDOcaine-EPINEPHrine 1%-1:100,000 30 mL, LIDOcaine HCL 10 mg/ml (1%) 20 mL, sodium bicarbonate 10 mL in sodium chloride 0.9% 500 mL solution, , MISCELLANEOUS, Once, Brandan Babin, DO    Review of Systems   Constitutional:  Negative for activity change, chills, diaphoresis, fatigue and fever.   Respiratory:  Negative for cough and shortness of breath.    Cardiovascular:  Positive for leg swelling. Negative for chest pain and claudication.        Hyperpigmentation LE   Gastrointestinal:  Negative for nausea and vomiting.   Musculoskeletal:  Positive for leg pain. Negative for joint swelling.   Integumentary:  Negative for rash and wound.   Neurological:  Negative for weakness and numbness.          II. PHYSICAL EXAM     Physical Exam  Constitutional:       General: She is awake. She is not in acute distress.     Appearance: Normal appearance. She is obese. She is not ill-appearing or toxic-appearing.   HENT:      Head: Normocephalic and atraumatic.   Eyes:      Extraocular Movements: Extraocular movements intact.      Conjunctiva/sclera: Conjunctivae normal.      Pupils: Pupils are equal, round, and reactive to light.   Neck:      Vascular: No carotid bruit or JVD.   Cardiovascular:      Rate and Rhythm: Normal rate and regular rhythm.      Pulses:           Dorsalis pedis pulses are detected w/ Doppler on the right side and detected w/ Doppler on the left side.        Posterior tibial pulses are detected w/ Doppler on the right side and detected w/ Doppler on the left side.      Heart sounds: No murmur heard.  Pulmonary:      Effort: Pulmonary effort is normal. No respiratory distress.      Breath sounds: No stridor. No wheezing, rhonchi or rales.   Musculoskeletal:         General: No swelling, tenderness or  deformity.      Right lower le+ Edema present.      Left lower le+ Edema present.      Comments: No evidence of cellulitis, weeping or open ulceration bilaterally.   Feet:      Comments: Triphasic hand-held dopplerable pulses of the bilateral dorsalis pedis and posterior tibial arteries.  Skin:     General: Skin is warm.      Capillary Refill: Capillary refill takes less than 2 seconds.      Coloration: Skin is not ashen.      Findings: No bruising, erythema, lesion, rash or wound.   Neurological:      Mental Status: She is alert and oriented to person, place, and time.      Motor: No weakness.   Psychiatric:         Speech: Speech normal.         Behavior: Behavior normal. Behavior is cooperative.         Reticular/Spider veins noted:  RLE: none  LLE: none    Varicose veins noted:  RLE: none  LLE:  none    CEAP Classification  Clinical Signs: Class 4 - Skin changes ascribed to venous disease  Etiologic Classification: Primary  Anatomic distribution: Superficial  Pathophysiologic dysfunction: Reflux         Venous Clinical Severity Score  Pain:2=Daily, moderate activity limitation, occasional analgesics  Varicose Veins: 1=Few, scattered. Branch varicose veins  Venous Edema: 2=Afternoon edema, above ankle  Pigmentation: 1=Diffuse, but limited in area and old (brown)  Inflammation: 0=None  Induration: 0=None  Number of Active Ulcers: 0=0  Active Ulceration, Duration: 0=None  Active Ulcer Size: 0=None  Compressive Therapy: 3=Full compliance, stockings + elevation  Total Score: 9       III. ASSESSMENT & PLAN (MEDICAL DECISION MAKING)     1. Hyperpigmentation of skin    2. Edema, lower extremity    3. Leg pain, bilateral    4. Venous insufficiency        Assessment/Diagnosis and Plan:  Patient is having some residual symptoms despite previous interventions.  Will order bilateral complete see the patient back with results.  In the meantime, continue with daily compression stockings, calf exercises and therapeutic  leg elevation.  Also continue to follow-up with orthopedics.       Brandan Babin, DO

## 2023-11-06 ENCOUNTER — HOSPITAL ENCOUNTER (OUTPATIENT)
Dept: RADIOLOGY | Facility: HOSPITAL | Age: 78
Discharge: HOME | End: 2023-11-06
Attending: FAMILY MEDICINE
Payer: MEDICARE

## 2023-11-06 ENCOUNTER — OFFICE VISIT (OUTPATIENT)
Dept: VASCULAR SURGERY | Facility: CLINIC | Age: 78
End: 2023-11-06
Payer: MEDICARE

## 2023-11-06 VITALS
RESPIRATION RATE: 16 BRPM | SYSTOLIC BLOOD PRESSURE: 148 MMHG | BODY MASS INDEX: 38.83 KG/M2 | DIASTOLIC BLOOD PRESSURE: 70 MMHG | WEIGHT: 211 LBS | HEIGHT: 62 IN | HEART RATE: 76 BPM

## 2023-11-06 DIAGNOSIS — L81.9 HYPERPIGMENTATION OF SKIN: ICD-10-CM

## 2023-11-06 DIAGNOSIS — M79.605 LEG PAIN, BILATERAL: ICD-10-CM

## 2023-11-06 DIAGNOSIS — M79.604 LEG PAIN, BILATERAL: ICD-10-CM

## 2023-11-06 DIAGNOSIS — R60.0 EDEMA, LOWER EXTREMITY: ICD-10-CM

## 2023-11-06 DIAGNOSIS — I87.2 VENOUS INSUFFICIENCY: Primary | ICD-10-CM

## 2023-11-06 PROCEDURE — 3077F PR MOST RECENT SYSTOLIC BLOOD PRESSURE >= 140 MM HG: ICD-10-PCS | Mod: CPTII,,, | Performed by: FAMILY MEDICINE

## 2023-11-06 PROCEDURE — 3078F PR MOST RECENT DIASTOLIC BLOOD PRESSURE < 80 MM HG: ICD-10-PCS | Mod: CPTII,,, | Performed by: FAMILY MEDICINE

## 2023-11-06 PROCEDURE — 1159F PR MEDICATION LIST DOCUMENTED IN MEDICAL RECORD: ICD-10-PCS | Mod: CPTII,,, | Performed by: FAMILY MEDICINE

## 2023-11-06 PROCEDURE — 99214 PR OFFICE/OUTPT VISIT, EST, LEVL IV, 30-39 MIN: ICD-10-PCS | Mod: S$PBB,,, | Performed by: FAMILY MEDICINE

## 2023-11-06 PROCEDURE — 1159F MED LIST DOCD IN RCRD: CPT | Mod: CPTII,,, | Performed by: FAMILY MEDICINE

## 2023-11-06 PROCEDURE — 93970 EXTREMITY STUDY: CPT | Mod: TC

## 2023-11-06 PROCEDURE — 93970 EXTREMITY STUDY: CPT | Mod: 26,,, | Performed by: FAMILY MEDICINE

## 2023-11-06 PROCEDURE — 3077F SYST BP >= 140 MM HG: CPT | Mod: CPTII,,, | Performed by: FAMILY MEDICINE

## 2023-11-06 PROCEDURE — 1160F RVW MEDS BY RX/DR IN RCRD: CPT | Mod: CPTII,,, | Performed by: FAMILY MEDICINE

## 2023-11-06 PROCEDURE — 99214 OFFICE O/P EST MOD 30 MIN: CPT | Mod: S$PBB,,, | Performed by: FAMILY MEDICINE

## 2023-11-06 PROCEDURE — 93970 US VENOUS REFLUX STUDY BILATERAL: ICD-10-PCS | Mod: 26,,, | Performed by: FAMILY MEDICINE

## 2023-11-06 PROCEDURE — 99215 OFFICE O/P EST HI 40 MIN: CPT | Mod: PBBFAC,25 | Performed by: FAMILY MEDICINE

## 2023-11-06 PROCEDURE — 1160F PR REVIEW ALL MEDS BY PRESCRIBER/CLIN PHARMACIST DOCUMENTED: ICD-10-PCS | Mod: CPTII,,, | Performed by: FAMILY MEDICINE

## 2023-11-06 PROCEDURE — 3078F DIAST BP <80 MM HG: CPT | Mod: CPTII,,, | Performed by: FAMILY MEDICINE

## 2023-11-06 NOTE — PROGRESS NOTES
VE[facility]ENTER CLINIC NOTE    Patient ID: Quiana Bland is a 78 y.o. female.    I. HISTORY     Chief Complaint:   Chief Complaint   Patient presents with    Follow-up     Room 3. PADDY COMP        HPI: Quiana Bland is a 78 y.o. female who presents today for follow-up and results to a bilateral complete venous reflux study performed today, 11/06/2023.  Study shows no evidence of DVT bilaterally.  The study also shows well ablated bilateral great saphenous veins and their entirety.  No other superficial venous reflux noted.    The patient presented recently on 10/31/2023 for a three-month follow-up after undergoing bilateral great saphenous vein radiofrequency ablation.  Patient's post ablation ultrasound today shows no evidence of heat induced thrombus and well ablated bilateral great saphenous veins.  She states that she has noticed less swelling and tightness in her lower extremities, however she states she continues to have some increasing leg cramps and a crawling type feeling of her medial lower legs.  She states these symptoms persist despite wearing her thigh-high compression daily.    Clinical Summary:  Patient initially seen on 02/28/2023 by referral from Dr Ramírez, cardiology, for evaluation of bilateral lower extremity leg swelling and discomfort.  Symptoms are progressive/worsening and began a proximally 2 years ago.  Location is bilateral lower extremities below the knees. Symptoms are worse at the end of the day.  History of venous interventions includes none.  Unknown family history of venous disease.  Patient also sees Dr. Lozoya, pulmonology, and has a history of obstructive sleep apnea and uses a CPAP nightly.  She also sleeps in a chair secondary to orthopnea.  Also complains of right knee pain and swelling and limited ambulation throughout the day.  Recent negative BNP.  Echocardiogram pending.    Venous Disease Medical Necessity Documentation Initial Visit Date:  02/28/2023 Return Check  Date: 06/08/2023   Have you ever had a rupture or bleed from a varicose vein in your leg(s)?              [] Yes  [x] No   [] Yes   [x] No   Have you ever been diagnosed with phlebitis, cellulitis, or inflammation in the area of the varicose veins of  your leg(s)?  [] Yes  [x] No    [] Yes   [x] No   Do you have darkened or inflamed skin on your legs?   [] Yes   [x] No   [] Yes   [x] No   Do you have leg swelling?     [x] Yes   [] No   [x] Yes   [] No   Do you have leg pain?   [x] Yes   [] No   [x] Yes   [] No   If yes, describe the type of pain?    [x]   Stabbing [x]  Radiating [x]  Aching   [x]  Tightness [x]  Throbbing               [x]  Burning [x]  Cramping          No change    Do you have leg discomfort?   [x] Yes   [] No   [x] Yes   [] No   If yes, describe the type of discomfort?    [x]  Heaviness [x]  Fullness   [x]  Restlessness [x] Tired/Fatigued [x] Itching          No change except now no itching    Have you ever worn compression hose?   [x] Yes   [] No   [x] Yes   [] No   If yes, how long?    18 months    3 months   Do you elevate your legs while sitting?   [x] Yes   [] No   [x] Yes   [] No   Does venous disease (varicose veins, ulcers, skin changes, leg pain/swelling) interfere with your daily life?  If yes, check activities you are limited or unable to do.    [x]  Shower  [x]   Walk  []  Exercise  [x] Play with children/grandchildren  [x]  Shop [] Work [x] Stand for any period of time [x] Sleep                               [x] Sitting for an extended period of time.           [x] Yes   [] No   [x] Yes   [] No   No change   Do you exercise/have you tried to exercise (i.e.  Walk our participate in a regular exercise routine)?  [] Yes  [x] No   [] Yes   [x] No   BMI 41.8   38.5          Past Medical History:   Diagnosis Date    CHF (congestive heart failure)     DM (diabetes mellitus)     High cholesterol     HTN (hypertension)     Seizures         Past Surgical History:   Procedure Laterality Date     CARDIAC CATHETERIZATION       SECTION      x 1    gall stones removed      HYSTERECTOMY      RADIOFREQUENCY ABLATION Right 2023    Procedure: Right GSV RF Ablation;  Surgeon: Brandan Babin DO;  Location: [facility] OR;  Service: Peripheral Vascular;  Laterality: Right;    RADIOFREQUENCY ABLATION Left 2023    Procedure: Left RF Ablation;  Surgeon: Brandan Babin DO;  Location: [facility] OR;  Service: Peripheral Vascular;  Laterality: Left;    TUBAL LIGATION      VAGINAL DELIVERY      x 5       Social History     Tobacco Use   Smoking Status Never   Smokeless Tobacco Never         Current Outpatient Medications:     albuterol 2 mg/5 mL syrup, take 1/2 (one-half) TEASPOONFUL (2.5mls) BY MOUTH THREE TIMES DAILY, Disp: , Rfl:     albuterol-ipratropium (DUO-NEB) 2.5 mg-0.5 mg/3 mL nebulizer solution, Ipratropium-Albuterol 0.5-2.5 (3) MG/3ML Inhalation Solution QTY: 120 mL Days: 30 Refills: 11  Written: 22 Patient Instructions: USE 1 VIAL IN NEBULIZER 4 TIMES DAILY, Disp: , Rfl:     azelastine (ASTELIN) 137 mcg (0.1 %) nasal spray, 1 spray 2 (two) times daily., Disp: , Rfl:     budesonide (PULMICORT) 0.5 mg/2 mL nebulizer solution, Budesonide 0.5 MG/2ML Inhalation Suspension QTY: 120 mL Days: 30 Refills: 11  Written: 22 Patient Instructions: USE 1 VIAL  IN  NEBULIZER TWICE  DAILY - rinse mouth out after each use, Disp: , Rfl:     chlorhexidine (PERIDEX) 0.12 % solution, SMARTSI.5 Ounce(s) By Mouth Twice Daily, Disp: , Rfl:     cyanocobalamin 1,000 mcg/mL injection, Cyanocobalamin 1000 MCG/ML Injection Solution QTY: 1 mL Days: 0 Refills: 0  Written: 22 Patient Instructions: ONCE MONTHLY, Disp: , Rfl:     dapagliflozin (FARXIGA) 10 mg tablet, Take 1 tablet (10 mg total) by mouth once daily., Disp: 90 tablet, Rfl: 3    fluticasone propionate (FLONASE) 50 mcg/actuation nasal spray, Fluticasone Propionate 50 MCG/ACT Nasal Suspension QTY: 1 gram Days: 30 Refills: 3  Written: 22  Patient Instructions: twice a day 1 spray each nostril, Disp: , Rfl:     glimepiride (AMARYL) 4 MG tablet, , Disp: , Rfl:     isosorbide mononitrate (IMDUR) 30 MG 24 hr tablet, Take 1 tablet (30 mg total) by mouth once daily., Disp: 90 tablet, Rfl: 3    levalbuterol (XOPENEX HFA) 45 mcg/actuation inhaler, Xopenex HFA 45 MCG/ACT Inhalation Aerosol QTY: 0  Days: 0 Refills: 0  Written: 07/07/22 Patient Instructions:, Disp: , Rfl:     LINZESS 145 mcg Cap capsule, TAKE ONE CAPSULE BY MOUTH ONCE DAILY FOR constipation, Disp: , Rfl:     losartan (COZAAR) 100 MG tablet, Losartan Potassium 100 MG Oral Tablet QTY: 90 tablet Days: 90 Refills: 3  Written: 08/03/22 Patient Instructions: once a day, Disp: , Rfl:     metFORMIN (GLUCOPHAGE) 500 MG tablet, , Disp: , Rfl:     metoprolol succinate (TOPROL-XL) 100 MG 24 hr tablet, Take 1 tablet (100 mg total) by mouth once daily., Disp: 90 tablet, Rfl: 3    morphine (MS CONTIN) 15 MG 12 hr tablet, Take 15 mg by mouth nightly., Disp: , Rfl:     neomycin-polymyxin-dexamethasone (MAXITROL) 3.5mg/mL-10,000 unit/mL-0.1 % DrpS, 1 drop into right EYE FOUR TIMES DAILY FOR 10 DAYS, Disp: , Rfl:     olopatadine (PATANOL) 0.1 % ophthalmic solution, Pataday 0.1% Ophthalmic Solution QTY: 0  Days: 0 Refills: 0  Written: 08/10/22 Patient Instructions:, Disp: , Rfl:     oxyCODONE-acetaminophen (PERCOCET)  mg per tablet, TAKE ONE TABLET BY MOUTH 2 TO 3 times DAILY AS NEEDED FOR PAIN, Disp: , Rfl:     pantoprazole (PROTONIX) 40 MG tablet, , Disp: , Rfl:     repaglinide (PRANDIN) 1 MG tablet, Repaglinide 1 MG Oral Tablet QTY: 60 tablet Days: 30 Refills: 5  Written: 04/11/22 Patient Instructions: twice a day with food, Disp: , Rfl:     RYMED, DEXCHLORPHENIRAMINE-PE, 2-10 mg Tab, Take 1 tablet by mouth 2 (two) times daily., Disp: , Rfl:     spironolactone-hydrochlorothiazide 25-25mg (ALDACTAZIDE) 25-25 mg Tab, Spironolactone-HCTZ 25-25 MG Oral Tablet QTY: 0 tablet Days: 7 Refills: 0  Written:  11/10/22 Patient Instructions:, Disp: , Rfl:     valsartan (DIOVAN) 160 MG tablet, TAKE ONE TABLET BY MOUTH DAILY (STOP losartan), Disp: , Rfl:   No current facility-administered medications for this visit.    Facility-Administered Medications Ordered in Other Visits:     LIDOcaine-EPINEPHrine 1%-1:100,000 30 mL, LIDOcaine HCL 10 mg/ml (1%) 20 mL, sodium bicarbonate 10 mL in sodium chloride 0.9% 500 mL solution, , MISCELLANEOUS, Once, Brandan Babin, DO    Review of Systems   Constitutional:  Negative for activity change, chills, diaphoresis, fatigue and fever.   Respiratory:  Negative for cough and shortness of breath.    Cardiovascular:  Positive for leg swelling. Negative for chest pain and claudication.        Hyperpigmentation LE   Gastrointestinal:  Negative for nausea and vomiting.   Musculoskeletal:  Positive for leg pain. Negative for joint swelling.   Integumentary:  Negative for rash and wound.   Neurological:  Negative for weakness and numbness.          II. PHYSICAL EXAM     Physical Exam  Constitutional:       General: She is awake. She is not in acute distress.     Appearance: Normal appearance. She is obese. She is not ill-appearing or toxic-appearing.   HENT:      Head: Normocephalic and atraumatic.   Eyes:      Extraocular Movements: Extraocular movements intact.      Conjunctiva/sclera: Conjunctivae normal.      Pupils: Pupils are equal, round, and reactive to light.   Neck:      Vascular: No carotid bruit or JVD.   Cardiovascular:      Rate and Rhythm: Normal rate and regular rhythm.      Pulses:           Dorsalis pedis pulses are detected w/ Doppler on the right side and detected w/ Doppler on the left side.        Posterior tibial pulses are detected w/ Doppler on the right side and detected w/ Doppler on the left side.      Heart sounds: No murmur heard.  Pulmonary:      Effort: Pulmonary effort is normal. No respiratory distress.      Breath sounds: No stridor. No wheezing, rhonchi or  rales.   Musculoskeletal:         General: No swelling, tenderness or deformity.      Right lower le+ Edema present.      Left lower le+ Edema present.      Comments: No evidence of cellulitis, weeping or open ulceration bilaterally.   Feet:      Comments: Triphasic hand-held dopplerable pulses of the bilateral dorsalis pedis and posterior tibial arteries.  Skin:     General: Skin is warm.      Capillary Refill: Capillary refill takes less than 2 seconds.      Coloration: Skin is not ashen.      Findings: No bruising, erythema, lesion, rash or wound.   Neurological:      Mental Status: She is alert and oriented to person, place, and time.      Motor: No weakness.   Psychiatric:         Speech: Speech normal.         Behavior: Behavior normal. Behavior is cooperative.         Reticular/Spider veins noted:  RLE: none  LLE: none    Varicose veins noted:  RLE: none  LLE:  none    CEAP Classification  Clinical Signs: Class 4 - Skin changes ascribed to venous disease  Etiologic Classification: Primary  Anatomic distribution: Superficial  Pathophysiologic dysfunction: Reflux       Venous Clinical Severity Score  Pain:2=Daily, moderate activity limitation, occasional analgesics  Varicose Veins: 1=Few, scattered. Branch varicose veins  Venous Edema: 2=Afternoon edema, above ankle  Pigmentation: 1=Diffuse, but limited in area and old (brown)  Inflammation: 0=None  Induration: 0=None  Number of Active Ulcers: 0=0  Active Ulceration, Duration: 0=None  Active Ulcer Size: 0=None  Compressive Therapy: 3=Full compliance, stockings + elevation  Total Score: 9       III. ASSESSMENT & PLAN (MEDICAL DECISION MAKING)     1. Venous insufficiency    2. Hyperpigmentation of skin    3. Edema, lower extremity          Assessment/Diagnosis and Plan:  No additional venous insufficiency noted on the patient's ultrasound today. Continue with daily compression stockings, calf exercises and therapeutic leg elevation. Follow up in 3 months  for a 6 month PA.     Also continue to follow-up with orthopedics.       Brandan Babin, DO

## 2024-02-21 ENCOUNTER — OFFICE VISIT (OUTPATIENT)
Dept: VASCULAR SURGERY | Facility: CLINIC | Age: 79
End: 2024-02-21
Payer: MEDICARE

## 2024-02-21 VITALS
RESPIRATION RATE: 16 BRPM | WEIGHT: 203 LBS | HEART RATE: 82 BPM | SYSTOLIC BLOOD PRESSURE: 144 MMHG | BODY MASS INDEX: 37.36 KG/M2 | DIASTOLIC BLOOD PRESSURE: 84 MMHG | HEIGHT: 62 IN

## 2024-02-21 DIAGNOSIS — M79.604 LEG PAIN, BILATERAL: ICD-10-CM

## 2024-02-21 DIAGNOSIS — I87.2 VENOUS INSUFFICIENCY: Primary | ICD-10-CM

## 2024-02-21 DIAGNOSIS — L81.9 HYPERPIGMENTATION OF SKIN: ICD-10-CM

## 2024-02-21 DIAGNOSIS — M79.605 LEG PAIN, BILATERAL: ICD-10-CM

## 2024-02-21 DIAGNOSIS — R60.0 EDEMA, LOWER EXTREMITY: ICD-10-CM

## 2024-02-21 PROCEDURE — 1160F RVW MEDS BY RX/DR IN RCRD: CPT | Mod: CPTII,,, | Performed by: FAMILY MEDICINE

## 2024-02-21 PROCEDURE — 99213 OFFICE O/P EST LOW 20 MIN: CPT | Mod: S$PBB,,, | Performed by: FAMILY MEDICINE

## 2024-02-21 PROCEDURE — 3077F SYST BP >= 140 MM HG: CPT | Mod: CPTII,,, | Performed by: FAMILY MEDICINE

## 2024-02-21 PROCEDURE — 99215 OFFICE O/P EST HI 40 MIN: CPT | Mod: PBBFAC | Performed by: FAMILY MEDICINE

## 2024-02-21 PROCEDURE — 3079F DIAST BP 80-89 MM HG: CPT | Mod: CPTII,,, | Performed by: FAMILY MEDICINE

## 2024-02-21 PROCEDURE — 1159F MED LIST DOCD IN RCRD: CPT | Mod: CPTII,,, | Performed by: FAMILY MEDICINE

## 2024-02-21 RX ORDER — MONTELUKAST SODIUM 10 MG/1
10 TABLET ORAL NIGHTLY
COMMUNITY

## 2024-02-21 RX ORDER — CYCLOBENZAPRINE HCL 5 MG
5 TABLET ORAL
COMMUNITY
Start: 2024-02-19 | End: 2024-05-14

## 2024-02-21 NOTE — PROGRESS NOTES
VEIN CENTER CLINIC NOTE    Patient ID: Quiana Bland is a 78 y.o. female.    I. HISTORY     Chief Complaint:   Chief Complaint   Patient presents with    Follow-up     6M PA S/P PADDY GSV RF        HPI: Quiana Bland is a 78 y.o. female who presents today for a 6-month follow-up after undergoing bilateral great saphenous vein radiofrequency ablation.  She states that she has noticed less swelling and tightness in her lower extremities, however she states she continues to have some increasing leg cramps and a crawling type feeling of her medial lower legs.  She also complains of pain in the right lateral leg it goes down to her foot.  She went to the ED this past Saturday with these complaints and they gave her a handout on sciatic nerve pain along with muscle relaxer.  She states the muscle relaxer has helped a little but she has not doing the assigned stretches and exercises given to her on her ER handout.  She is also contacted Dr. Meza's office for further evaluation.    Clinical Summary:  Patient initially seen on 02/28/2023 by referral from Dr Ramírez, cardiology, for evaluation of bilateral lower extremity leg swelling and discomfort.  Symptoms are progressive/worsening and began a proximally 2 years ago.  Location is bilateral lower extremities below the knees. Symptoms are worse at the end of the day.  History of venous interventions includes none.  Unknown family history of venous disease.  Patient also sees Dr. Lozoya, pulmonology, and has a history of obstructive sleep apnea and uses a CPAP nightly.  She also sleeps in a chair secondary to orthopnea.  Also complains of right knee pain and swelling and limited ambulation throughout the day.  Recent negative BNP.  Echocardiogram pending.    Bilateral complete venous reflux study performed 11/06/2023 shows no evidence of DVT bilaterally.  The study also shows well ablated bilateral great saphenous veins and their entirety.  No other superficial  venous reflux noted.    Venous Disease Medical Necessity Documentation Initial Visit Date:  02/28/2023 Return Check Date: 06/08/2023   Have you ever had a rupture or bleed from a varicose vein in your leg(s)?              [] Yes  [x] No   [] Yes   [x] No   Have you ever been diagnosed with phlebitis, cellulitis, or inflammation in the area of the varicose veins of  your leg(s)?  [] Yes  [x] No    [] Yes   [x] No   Do you have darkened or inflamed skin on your legs?   [] Yes   [x] No   [] Yes   [x] No   Do you have leg swelling?     [x] Yes   [] No   [x] Yes   [] No   Do you have leg pain?   [x] Yes   [] No   [x] Yes   [] No   If yes, describe the type of pain?    [x]   Stabbing [x]  Radiating [x]  Aching   [x]  Tightness [x]  Throbbing               [x]  Burning [x]  Cramping          No change    Do you have leg discomfort?   [x] Yes   [] No   [x] Yes   [] No   If yes, describe the type of discomfort?    [x]  Heaviness [x]  Fullness   [x]  Restlessness [x] Tired/Fatigued [x] Itching          No change except now no itching    Have you ever worn compression hose?   [x] Yes   [] No   [x] Yes   [] No   If yes, how long?    18 months    3 months   Do you elevate your legs while sitting?   [x] Yes   [] No   [x] Yes   [] No   Does venous disease (varicose veins, ulcers, skin changes, leg pain/swelling) interfere with your daily life?  If yes, check activities you are limited or unable to do.    [x]  Shower  [x]   Walk  []  Exercise  [x] Play with children/grandchildren  [x]  Shop [] Work [x] Stand for any period of time [x] Sleep                               [x] Sitting for an extended period of time.           [x] Yes   [] No   [x] Yes   [] No   No change   Do you exercise/have you tried to exercise (i.e.  Walk our participate in a regular exercise routine)?  [] Yes  [x] No   [] Yes   [x] No   BMI 41.8   38.5          Past Medical History:   Diagnosis Date    CHF (congestive heart failure)     DM (diabetes mellitus)      High cholesterol     HTN (hypertension)     Seizures         Past Surgical History:   Procedure Laterality Date    CARDIAC CATHETERIZATION       SECTION      x 1    gall stones removed      HYSTERECTOMY      RADIOFREQUENCY ABLATION Right 2023    Procedure: Right GSV RF Ablation;  Surgeon: Brandan Babin DO;  Location: [facility];  Service: Peripheral Vascular;  Laterality: Right;    RADIOFREQUENCY ABLATION Left 2023    Procedure: Left RF Ablation;  Surgeon: Brandan Babin DO;  Location: [facility] OR;  Service: Peripheral Vascular;  Laterality: Left;    TUBAL LIGATION      VAGINAL DELIVERY      x 5       Social History     Tobacco Use   Smoking Status Never   Smokeless Tobacco Never         Current Outpatient Medications:     albuterol-ipratropium (DUO-NEB) 2.5 mg-0.5 mg/3 mL nebulizer solution, Ipratropium-Albuterol 0.5-2.5 (3) MG/3ML Inhalation Solution QTY: 120 mL Days: 30 Refills: 11  Written: 22 Patient Instructions: USE 1 VIAL IN NEBULIZER 4 TIMES DAILY, Disp: , Rfl:     cyclobenzaprine (FLEXERIL) 5 MG tablet, Take 5 mg by mouth., Disp: , Rfl:     dapagliflozin (FARXIGA) 10 mg tablet, Take 1 tablet (10 mg total) by mouth once daily., Disp: 90 tablet, Rfl: 3    fluticasone propionate (FLONASE) 50 mcg/actuation nasal spray, Fluticasone Propionate 50 MCG/ACT Nasal Suspension QTY: 1 gram Days: 30 Refills: 3  Written: 22 Patient Instructions: twice a day 1 spray each nostril, Disp: , Rfl:     glimepiride (AMARYL) 4 MG tablet, , Disp: , Rfl:     LINZESS 145 mcg Cap capsule, TAKE ONE CAPSULE BY MOUTH ONCE DAILY FOR constipation, Disp: , Rfl:     metFORMIN (GLUCOPHAGE) 500 MG tablet, , Disp: , Rfl:     metoprolol succinate (TOPROL-XL) 100 MG 24 hr tablet, Take 1 tablet (100 mg total) by mouth once daily., Disp: 90 tablet, Rfl: 3    montelukast (SINGULAIR) 10 mg tablet, Take 10 mg by mouth every evening., Disp: , Rfl:     pantoprazole (PROTONIX) 40 MG tablet, , Disp: , Rfl:      valsartan (DIOVAN) 160 MG tablet, TAKE ONE TABLET BY MOUTH DAILY (STOP losartan), Disp: , Rfl:     albuterol 2 mg/5 mL syrup, take 1/2 (one-half) TEASPOONFUL (2.5mls) BY MOUTH THREE TIMES DAILY, Disp: , Rfl:     azelastine (ASTELIN) 137 mcg (0.1 %) nasal spray, 1 spray 2 (two) times daily., Disp: , Rfl:     budesonide (PULMICORT) 0.5 mg/2 mL nebulizer solution, Budesonide 0.5 MG/2ML Inhalation Suspension QTY: 120 mL Days: 30 Refills: 11  Written: 22 Patient Instructions: USE 1 VIAL  IN  NEBULIZER TWICE  DAILY - rinse mouth out after each use, Disp: , Rfl:     chlorhexidine (PERIDEX) 0.12 % solution, SMARTSI.5 Ounce(s) By Mouth Twice Daily, Disp: , Rfl:     cyanocobalamin 1,000 mcg/mL injection, Cyanocobalamin 1000 MCG/ML Injection Solution QTY: 1 mL Days: 0 Refills: 0  Written: 22 Patient Instructions: ONCE MONTHLY, Disp: , Rfl:     isosorbide mononitrate (IMDUR) 30 MG 24 hr tablet, Take 1 tablet (30 mg total) by mouth once daily., Disp: 90 tablet, Rfl: 3    levalbuterol (XOPENEX HFA) 45 mcg/actuation inhaler, Xopenex HFA 45 MCG/ACT Inhalation Aerosol QTY: 0  Days: 0 Refills: 0  Written: 22 Patient Instructions:, Disp: , Rfl:     losartan (COZAAR) 100 MG tablet, Losartan Potassium 100 MG Oral Tablet QTY: 90 tablet Days: 90 Refills: 3  Written: 22 Patient Instructions: once a day, Disp: , Rfl:     morphine (MS CONTIN) 15 MG 12 hr tablet, Take 15 mg by mouth nightly., Disp: , Rfl:     neomycin-polymyxin-dexamethasone (MAXITROL) 3.5mg/mL-10,000 unit/mL-0.1 % DrpS, 1 drop into right EYE FOUR TIMES DAILY FOR 10 DAYS, Disp: , Rfl:     olopatadine (PATANOL) 0.1 % ophthalmic solution, Pataday 0.1% Ophthalmic Solution QTY: 0  Days: 0 Refills: 0  Written: 08/10/22 Patient Instructions:, Disp: , Rfl:     oxyCODONE-acetaminophen (PERCOCET)  mg per tablet, TAKE ONE TABLET BY MOUTH 2 TO 3 times DAILY AS NEEDED FOR PAIN, Disp: , Rfl:     repaglinide (PRANDIN) 1 MG tablet, Repaglinide 1 MG Oral  Tablet QTY: 60 tablet Days: 30 Refills: 5  Written: 04/11/22 Patient Instructions: twice a day with food, Disp: , Rfl:     RYMED, DEXCHLORPHENIRAMINE-PE, 2-10 mg Tab, Take 1 tablet by mouth 2 (two) times daily., Disp: , Rfl:     spironolactone-hydrochlorothiazide 25-25mg (ALDACTAZIDE) 25-25 mg Tab, Spironolactone-HCTZ 25-25 MG Oral Tablet QTY: 0 tablet Days: 7 Refills: 0  Written: 11/10/22 Patient Instructions:, Disp: , Rfl:   No current facility-administered medications for this visit.    Facility-Administered Medications Ordered in Other Visits:     LIDOcaine-EPINEPHrine 1%-1:100,000 30 mL, LIDOcaine HCL 10 mg/ml (1%) 20 mL, sodium bicarbonate 10 mL in sodium chloride 0.9% 500 mL solution, , MISCELLANEOUS, Once, Brandan Babin, DO    Review of Systems   Constitutional:  Negative for activity change, chills, diaphoresis, fatigue and fever.   Respiratory:  Negative for cough and shortness of breath.    Cardiovascular:  Positive for leg swelling. Negative for chest pain and claudication.        Hyperpigmentation LE   Gastrointestinal:  Negative for nausea and vomiting.   Musculoskeletal:  Positive for leg pain. Negative for joint swelling.   Integumentary:  Negative for rash and wound.   Neurological:  Negative for weakness and numbness.          II. PHYSICAL EXAM     Physical Exam  Constitutional:       General: She is awake. She is not in acute distress.     Appearance: Normal appearance. She is obese. She is not ill-appearing or toxic-appearing.   HENT:      Head: Normocephalic and atraumatic.   Eyes:      Extraocular Movements: Extraocular movements intact.      Conjunctiva/sclera: Conjunctivae normal.      Pupils: Pupils are equal, round, and reactive to light.   Neck:      Vascular: No carotid bruit or JVD.   Cardiovascular:      Rate and Rhythm: Normal rate and regular rhythm.      Pulses:           Dorsalis pedis pulses are detected w/ Doppler on the right side and detected w/ Doppler on the left side.         Posterior tibial pulses are detected w/ Doppler on the right side and detected w/ Doppler on the left side.      Heart sounds: No murmur heard.  Pulmonary:      Effort: Pulmonary effort is normal. No respiratory distress.      Breath sounds: No stridor. No wheezing, rhonchi or rales.   Musculoskeletal:         General: No swelling, tenderness or deformity.      Right lower le+ Edema present.      Left lower le+ Edema present.      Comments: No evidence of cellulitis, weeping or open ulceration bilaterally.   Feet:      Comments: Triphasic hand-held dopplerable pulses of the bilateral dorsalis pedis and posterior tibial arteries.  Skin:     General: Skin is warm.      Capillary Refill: Capillary refill takes less than 2 seconds.      Coloration: Skin is not ashen.      Findings: No bruising, erythema, lesion, rash or wound.   Neurological:      Mental Status: She is alert and oriented to person, place, and time.      Motor: No weakness.   Psychiatric:         Speech: Speech normal.         Behavior: Behavior normal. Behavior is cooperative.         Reticular/Spider veins noted:  RLE: none  LLE: none    Varicose veins noted:  RLE: none  LLE:  none    CEAP Classification  Clinical Signs: Class 4 - Skin changes ascribed to venous disease  Etiologic Classification: Primary  Anatomic distribution: Superficial  Pathophysiologic dysfunction: Reflux       Venous Clinical Severity Score  Pain:2=Daily, moderate activity limitation, occasional analgesics  Varicose Veins: 1=Few, scattered. Branch varicose veins  Venous Edema: 2=Afternoon edema, above ankle  Pigmentation: 1=Diffuse, but limited in area and old (brown)  Inflammation: 0=None  Induration: 0=None  Number of Active Ulcers: 0=0  Active Ulceration, Duration: 0=None  Active Ulcer Size: 0=None  Compressive Therapy: 3=Full compliance, stockings + elevation  Total Score: 9       III. ASSESSMENT & PLAN (MEDICAL DECISION MAKING)     1. Venous insufficiency    2.  Hyperpigmentation of skin    3. Edema, lower extremity    4. Leg pain, bilateral      Assessment/Diagnosis and Plan:   Continue with daily compression stockings, calf exercises and therapeutic leg elevation.  The patient also needs to do her prescribed exercises and stretches.  Also follow up with Dr. Redding.  Follow up in 6 months for a 12 month PA.       Brandan Babin, DO

## 2024-03-15 DIAGNOSIS — M54.16 LUMBAR RADICULOPATHY: Primary | ICD-10-CM

## 2024-03-19 ENCOUNTER — OFFICE VISIT (OUTPATIENT)
Dept: SPINE | Facility: CLINIC | Age: 79
End: 2024-03-19
Payer: MEDICARE

## 2024-03-19 ENCOUNTER — HOSPITAL ENCOUNTER (OUTPATIENT)
Dept: RADIOLOGY | Facility: HOSPITAL | Age: 79
Discharge: HOME | End: 2024-03-19
Attending: ORTHOPAEDIC SURGERY
Payer: MEDICARE

## 2024-03-19 DIAGNOSIS — M54.16 LUMBAR RADICULOPATHY: ICD-10-CM

## 2024-03-19 DIAGNOSIS — M54.2 CERVICALGIA: Primary | ICD-10-CM

## 2024-03-19 PROCEDURE — 1159F MED LIST DOCD IN RCRD: CPT | Mod: CPTII,,, | Performed by: ORTHOPAEDIC SURGERY

## 2024-03-19 PROCEDURE — 99214 OFFICE O/P EST MOD 30 MIN: CPT | Mod: PBBFAC,25 | Performed by: ORTHOPAEDIC SURGERY

## 2024-03-19 PROCEDURE — 72110 X-RAY EXAM L-2 SPINE 4/>VWS: CPT | Mod: TC

## 2024-03-19 PROCEDURE — 72110 X-RAY EXAM L-2 SPINE 4/>VWS: CPT | Mod: 26,,, | Performed by: ORTHOPAEDIC SURGERY

## 2024-03-19 PROCEDURE — 99204 OFFICE O/P NEW MOD 45 MIN: CPT | Mod: S$PBB,,, | Performed by: ORTHOPAEDIC SURGERY

## 2024-03-19 NOTE — PROGRESS NOTES
AP, lateral, flexion/extension views of the lumbar spine reviewed    On the AP there is lumbar curvature to the left.  There are 5 non-rib-bearing lumbar vertebrae.  On the lateral there is decreased lumbar lordosis.  There is spondylotic disease with decreased disc height and osteophyte formation noted.  Grade 1 anterolisthesis at L3-4 and L4-5, worse with flexion.    Impression:  Spondylotic changes of the lumbar spine as noted above

## 2024-03-19 NOTE — PROGRESS NOTES
MDM/time:  Greater than 45 minutes spent on this encounter including 15 minutes reviewing imaging and notes, 20 minutes with the patient, 10 minutes documentation    ASSESSMENT:  78 y.o. 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 female here for evaluation of mid to lower back pain that radiates into bilateral buttocks.  Patient reports a 20 year history of back pain and leg pain she reports she was seen at [facility] due to leg numbness and tingling unable to walk with physical therapy she regained strength to walk but refused surgery at that time.  She reports 15 years ago started with a constant back pain and 10 years ago started with pain management Dr. Meza for injections and medications.  She reports most recently she has had increased back pain unable to stand/walk/lay down flat to sleep.  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  Uses a cane or walker for ambulation has had multiple falls.  Denies bladder bowel incontinence.  Unable to stand or walk for any length of time due to pain.  Currently taking oxycodone and morphine tablets as needed for pain.  No recent physical therapy.  Prior pain management as discussed above.  Recent MRI 11/21/2023 at [facility].  No prior spine surgery.  Patient is nonsmoker.    IMAGING:  AP, lateral, flexion/extension views of the lumbar spine reviewed     On the AP there is lumbar curvature to the left.  There are 5 non-rib-bearing lumbar vertebrae.  On the lateral there is decreased lumbar lordosis.  There is spondylotic disease with decreased disc height and osteophyte formation noted.  Grade 1 anterolisthesis at L3-4 and L4-5, worse with flexion.     Impression:  Spondylotic changes of the lumbar spine as  noted above    2023 MRI lumbar spine [facility] reviewed showed:   T12 hemangioma  L2-3 moderate central stenosis with bilateral lateral recess stenosis and moderate right greater than left foraminal stenosis   L3-4 grade 1 spondylolisthesis with moderate central stenosis and bilateral lateral recess stenosis with bilateral foraminal stenosis   L4-5 grade 1 spondylolisthesis with right paracentral disc herniation with severe central stenosis and bilateral lateral recess stenosis with severe bilateral foraminal stenosis   L5-S1 moderate bilateral lateral recess stenosis with severe left/moderate right foraminal stenosis  Past Medical History:   Diagnosis Date    CHF (congestive heart failure)     DM (diabetes mellitus)     High cholesterol     HTN (hypertension)     Seizures      Past Surgical History:   Procedure Laterality Date    CARDIAC CATHETERIZATION       SECTION      x 1    gall stones removed      HYSTERECTOMY      RADIOFREQUENCY ABLATION Right 2023    Procedure: Right GSV RF Ablation;  Surgeon: Brandan Babin DO;  Location: [facility], [place];  Service: Peripheral Vascular;  Laterality: Right;    RADIOFREQUENCY ABLATION Left 2023    Procedure: Left RF Ablation;  Surgeon: Brandan Babin DO;  Location: [facility], [place];  Service: Peripheral Vascular;  Laterality: Left;    TUBAL LIGATION      VAGINAL DELIVERY      x 5     Social History     Tobacco Use    Smoking status: Never    Smokeless tobacco: Never   Substance Use Topics    Alcohol use: Never    Drug use: Yes     Types: Hydrocodone      Current Outpatient Medications   Medication Instructions    albuterol 2 mg/5 mL syrup take 1/2 (one-half) TEASPOONFUL (2.5mls) BY MOUTH THREE TIMES DAILY    albuterol-ipratropium (DUO-NEB) 2.5 mg-0.5 mg/3 mL nebulizer solution Ipratropium-Albuterol 0.5-2.5 (3) MG/3ML Inhalation Solution QTY: 120 mL Days: 30 Refills: 11  Written: 22 Patient Instructions: USE 1 VIAL IN NEBULIZER 4 TIMES DAILY     azelastine (ASTELIN) 137 mcg (0.1 %) nasal spray 1 spray, 2 times daily    budesonide (PULMICORT) 0.5 mg/2 mL nebulizer solution Budesonide 0.5 MG/2ML Inhalation Suspension QTY: 120 mL Days: 30 Refills: 11  Written: 22 Patient Instructions: USE 1 VIAL  IN  NEBULIZER TWICE  DAILY - rinse mouth out after each use    chlorhexidine (PERIDEX) 0.12 % solution SMARTSI.5 Ounce(s) By Mouth Twice Daily    cyanocobalamin 1,000 mcg/mL injection Cyanocobalamin 1000 MCG/ML Injection Solution QTY: 1 mL Days: 0 Refills: 0  Written: 22 Patient Instructions: ONCE MONTHLY    cyclobenzaprine (FLEXERIL) 5 mg, Oral    FARXIGA 10 mg, Oral, Daily    fluticasone propionate (FLONASE) 50 mcg/actuation nasal spray Fluticasone Propionate 50 MCG/ACT Nasal Suspension QTY: 1 gram Days: 30 Refills: 3  Written: 22 Patient Instructions: twice a day 1 spray each nostril    glimepiride (AMARYL) 4 MG tablet No dose, route, or frequency recorded.    isosorbide mononitrate (IMDUR) 30 mg, Oral, Daily    levalbuterol (XOPENEX HFA) 45 mcg/actuation inhaler Xopenex HFA 45 MCG/ACT Inhalation Aerosol QTY: 0  Days: 0 Refills: 0  Written: 22 Patient Instructions:    LINZESS 145 mcg Cap capsule TAKE ONE CAPSULE BY MOUTH ONCE DAILY FOR constipation    losartan (COZAAR) 100 MG tablet Losartan Potassium 100 MG Oral Tablet QTY: 90 tablet Days: 90 Refills: 3  Written: 22 Patient Instructions: once a day    metFORMIN (GLUCOPHAGE) 500 MG tablet No dose, route, or frequency recorded.    metoprolol succinate (TOPROL-XL) 100 mg, Oral, Daily    montelukast (SINGULAIR) 10 mg, Oral, Nightly    morphine (MS CONTIN) 15 mg, Oral, Nightly    neomycin-polymyxin-dexamethasone (MAXITROL) 3.5mg/mL-10,000 unit/mL-0.1 % DrpS 1 drop into right EYE FOUR TIMES DAILY FOR 10 DAYS    olopatadine (PATANOL) 0.1 % ophthalmic solution Pataday 0.1% Ophthalmic Solution QTY: 0  Days: 0 Refills: 0  Written: 08/10/22 Patient Instructions:    oxyCODONE-acetaminophen  (PERCOCET)  mg per tablet TAKE ONE TABLET BY MOUTH 2 TO 3 times DAILY AS NEEDED FOR PAIN    pantoprazole (PROTONIX) 40 MG tablet No dose, route, or frequency recorded.    repaglinide (PRANDIN) 1 MG tablet Repaglinide 1 MG Oral Tablet QTY: 60 tablet Days: 30 Refills: 5  Written: 04/11/22 Patient Instructions: twice a day with food    RYMED, DEXCHLORPHENIRAMINE-PE, 2-10 mg Tab 1 tablet, Oral, 2 times daily    spironolactone-hydrochlorothiazide 25-25mg (ALDACTAZIDE) 25-25 mg Tab Spironolactone-HCTZ 25-25 MG Oral Tablet QTY: 0 tablet Days: 7 Refills: 0  Written: 11/10/22 Patient Instructions:    valsartan (DIOVAN) 160 MG tablet TAKE ONE TABLET BY MOUTH DAILY (STOP losartan)        EXAM:  Constitutional  General Appearance:  BMI 37.13 obese, NAD  Psychiatric   Orientation: Oriented to time, oriented to place, oriented to person  Mood and Affect: Active and alert, normal mood, normal affect  Gait and Station   Appearance:  Antalgic gait to the right uses a cane/walker for ambulation, unable to tandem gait, unable to walk on toes, unable to walk on heels    CERVICAL  Musculoskeletal System  Shoulder:  normal appearance, no instability, no tenderness, normal ROM right, normal ROM left, Pain with ROM    Cervical Spine  Inspection:  alignment normal, no muscle atrophy  Soft Tissue Palpation on the Right:  no tenderness of the paracervicals, no tenderness of the trapezius, no tenderness of the rhomboid  Soft Tissue Palpation on the Left:  no tenderness of the paracervicals, no tenderness of the trapezius, no tenderness of the rhomboid  Bony Palpation:  no tenderness of the occipital protuberance  Active Range:     Flexion decreased, Extension decreased, Rotation to the left decreased, Rotation to the right decreased, Lateral flexion to the left decreased, Lateral flexion to the right decreased   Pain elicited on motion    Motor Strength  C5 on the right:  abduction deltoid 5/5  C5 on the left:  abduction deltoid 5/5  C6  on the Right:  flexion biceps 4/5, wrist extension 4/5  C6 on the Left:  flexion biceps 5/5, wrist extension 5/5  C7 on the Right:  extension fingers 4/5, extension triceps 4/5  C7 on the Left:  extension fingers 5/5, extension triceps 5/5  C8 on the Right:  flexion fingers 4/5  C8 on the Left:  flexion fingers 5/5  T1 on the Right:  abduction fingers 4/5  T1 on the Left:  abduction fingers 5/5    Neurological System  Sensation on the Right:  normal sensation of the extremities: right, normal median nerve distribution, normal ulnar nerve distribution  Sensation on the Left:  normal sensation of the extremities: left, normal median nerve distribution, normal ulnar nerve distribution  Biceps Reflex Right:  normal, Brachioradialis Reflex Right:  normal, Triceps Reflex Right: normal  Biceps Reflex Left:  normal, Brachioradialis Reflex Left: normal, Triceps Reflex Left:  normal  Special Test on the Right:  Spurlings test negative, Hoffmans reflex absent, no ankle clonus, Durkan test negative, Tinels sign negative at the elbow  Special Test on the Left:  Spurlings test negative, Hoffmans reflex absent, no ankle clonus, Durkan test negative, Tinels sign negative at the elbow    Skin:   Head and Neck:  normal   Right Upper Extremity:  normal   Left Upper Extremity:  normal    Cardiovascular:   Arterial Pulses Right:  radial right   Arterial Pulses Left:  radial left   Edema Right:  none   Edema Left:  none    LUMBAR  Musculoskeletal System   Hips: Normal appearance, no leg length discrepancy, normal motion; left, normal motion; right    Lumbar Spine                   Inspection:  Normal alignment, normal sagittal balance                  Range of motion:  Decreased flexion, extension, lateral bending, rotation. Pain with range of motion                  Bony Palpation of the Lumbar Spine:  No tenderness of the spinous process, no tenderness of the sacrum, no tenderness of the coccyx                  Bony Palpation of the  Right Hip:  No tenderness of the iliac crest, no tenderness of the sciatic notch, no tenderness of the SI joint                  Bony Palpation of the Left Hip:  No tenderness of the iliac crest, no tenderness of the sciatic notch, no tenderness of the SI joint                  Soft Tissue Palpation on the Right:  No tenderness of the paraspinal region, no tenderness of the iliolumbar region                  Soft Tissue Palpation on the Left:  No tenderness of the paraspinal region, no tenderness of the iliolumbar region    Motor Strength   L1 Right:  Hip flexion iliopsoas 4/5    L1 Left:  Hip flexion iliopsoas 4/5              L2-L4 Right:  Knee extension quadriceps 4/5, tibialis anterior 4/5              L2-L4 Left:  Knee extension quadriceps 5/5, tibialis anterior 5/5   L5 Right:  Extensor hallucis llongus 4/5,    L5 Left:  Extensor hallucis longus 5/5,    S1 Right:  Plantar flexion gastrocnemius 4/5   S1 Left:  Plantar flexion gastrocnemius 5/5    Neurological System   Ankle Reflex Right:  normal   Ankle Reflex Left: normal   Knee Reflex Right:  normal   Knee Reflex Left:  normal   Sensation on the Right:  L2 normal, L3 normal, L4 normal, L5 normal, S1 normal   Sensation on the Left:  L2 normal, L3 normal, L4 normal, L5 normal, S1 normal              Special Test on the Right:  Seated straight leg raising test negative, no clonus of the ankle              Special Test on the Left:  Seated straight leg raising test negative, no clonus of the ankle    Skin   Lumbosacral Spine:  Normal skin    Cardiovascular System   Arterial Pulses Right:  Posterior tibialis normal, dorsalis pedis normal   Arterial Pulses Left:  Posterior tibialis normal, dorsalis pedis normal   Edema Right: None   Edema Left:  None

## 2024-03-25 RX ORDER — DAPAGLIFLOZIN 10 MG/1
10 TABLET, FILM COATED ORAL
Qty: 90 TABLET | Refills: 3 | OUTPATIENT
Start: 2024-03-25

## 2024-03-25 RX ORDER — METOPROLOL SUCCINATE 100 MG/1
100 TABLET, EXTENDED RELEASE ORAL
Qty: 90 TABLET | Refills: 3 | OUTPATIENT
Start: 2024-03-25

## 2024-03-28 RX ORDER — METOPROLOL SUCCINATE 100 MG/1
100 TABLET, EXTENDED RELEASE ORAL
Qty: 90 TABLET | Refills: 3 | OUTPATIENT
Start: 2024-03-28

## 2024-04-03 DIAGNOSIS — M54.12 CERVICAL RADICULOPATHY: Primary | ICD-10-CM

## 2024-04-05 ENCOUNTER — HOSPITAL ENCOUNTER (OUTPATIENT)
Dept: RADIOLOGY | Facility: HOSPITAL | Age: 79
Discharge: HOME | End: 2024-04-05
Attending: ORTHOPAEDIC SURGERY
Payer: MEDICARE

## 2024-04-05 ENCOUNTER — HOSPITAL ENCOUNTER (OUTPATIENT)
Dept: RADIOLOGY | Facility: HOSPITAL | Age: 79
Discharge: HOME | End: 2024-04-05
Attending: NURSE PRACTITIONER
Payer: MEDICARE

## 2024-04-05 ENCOUNTER — OFFICE VISIT (OUTPATIENT)
Dept: SPINE | Facility: CLINIC | Age: 79
End: 2024-04-05
Payer: MEDICARE

## 2024-04-05 DIAGNOSIS — M54.16 LUMBAR RADICULOPATHY: ICD-10-CM

## 2024-04-05 DIAGNOSIS — M51.36 DDD (DEGENERATIVE DISC DISEASE), LUMBAR: Primary | ICD-10-CM

## 2024-04-05 DIAGNOSIS — M54.12 CERVICAL RADICULOPATHY: ICD-10-CM

## 2024-04-05 DIAGNOSIS — M43.16 SPONDYLOLISTHESIS, LUMBAR REGION: ICD-10-CM

## 2024-04-05 DIAGNOSIS — M54.2 CERVICALGIA: ICD-10-CM

## 2024-04-05 DIAGNOSIS — M48.062 LUMBAR STENOSIS WITH NEUROGENIC CLAUDICATION: ICD-10-CM

## 2024-04-05 PROCEDURE — 99211 OFF/OP EST MAY X REQ PHY/QHP: CPT | Mod: PBBFAC,25 | Performed by: ORTHOPAEDIC SURGERY

## 2024-04-05 PROCEDURE — 72050 X-RAY EXAM NECK SPINE 4/5VWS: CPT | Mod: TC

## 2024-04-05 PROCEDURE — 72141 MRI NECK SPINE W/O DYE: CPT | Mod: 26,,, | Performed by: STUDENT IN AN ORGANIZED HEALTH CARE EDUCATION/TRAINING PROGRAM

## 2024-04-05 PROCEDURE — 72141 MRI NECK SPINE W/O DYE: CPT | Mod: TC

## 2024-04-05 PROCEDURE — 99214 OFFICE O/P EST MOD 30 MIN: CPT | Mod: S$PBB,,, | Performed by: ORTHOPAEDIC SURGERY

## 2024-04-05 PROCEDURE — 72050 X-RAY EXAM NECK SPINE 4/5VWS: CPT | Mod: 26,,, | Performed by: ORTHOPAEDIC SURGERY

## 2024-04-05 NOTE — PROGRESS NOTES
MDM/time:  Greater than 30 minutes spent on this encounter including 10 minutes reviewing imaging and notes, 15 minutes with the patient, 5 minutes documentation    ASSESSMENT:  78 y.o. 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 Meza and consider further injections.  Follow-up in 6 months      HPI:  78 y.o. female here for repeat evaluation of mid to lower back pain that radiates into bilateral buttocks.  Patient reports a 20 year history of back pain and leg pain she reports she was seen at [facility] due to leg numbness and tingling unable to walk with physical therapy she regained strength to walk but refused surgery at that time.  She reports 15 years ago started with a constant back pain and 10 years ago started with pain management Dr. Meza for injections and medications.  She reports most recently she has had increased back pain unable to stand/walk/lay down flat to sleep.  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  Uses a cane or walker for ambulation has had multiple falls.  Denies bladder bowel incontinence.  Unable to stand or walk for any length of time due to pain.  Currently taking oxycodone and morphine tablets as needed for pain.  No recent physical therapy.  Prior pain management as discussed above.  No prior spine surgery.  Patient is nonsmoker.    IMAGING:  X-rays lumbar spine reviewed show:  On the AP there is lumbar curvature to the left.  There are 5 non-rib-bearing lumbar vertebrae.  On the lateral there is decreased lumbar lordosis.  There is spondylotic disease with decreased disc height and osteophyte formation noted.  Grade 1 anterolisthesis at L3-4 and L4-5, worse with flexion.     MRI cervical spine 04/05/2024 reviewed shows no significant stenosis    11/21/2023 MRI lumbar spine [facility] reviewed  showed:   T12 hemangioma  L2-3 moderate central stenosis with bilateral lateral recess stenosis and moderate right greater than left foraminal stenosis   L3-4 grade 1 spondylolisthesis with moderate central stenosis and bilateral lateral recess stenosis with bilateral foraminal stenosis   L4-5 grade 1 spondylolisthesis with right paracentral disc herniation with severe central stenosis and bilateral lateral recess stenosis with severe bilateral foraminal stenosis   L5-S1 moderate bilateral lateral recess stenosis with severe left/moderate right foraminal stenosis    Past Medical History:   Diagnosis Date    CHF (congestive heart failure)     DM (diabetes mellitus)     High cholesterol     HTN (hypertension)     Seizures      Past Surgical History:   Procedure Laterality Date    CARDIAC CATHETERIZATION       SECTION      x 1    gall stones removed      HYSTERECTOMY      RADIOFREQUENCY ABLATION Right 2023    Procedure: Right GSV RF Ablation;  Surgeon: Brandan Babin DO;  Location: [facility];  Service: Peripheral Vascular;  Laterality: Right;    RADIOFREQUENCY ABLATION Left 2023    Procedure: Left RF Ablation;  Surgeon: Brandan Babin DO;  Location: [facility];  Service: Peripheral Vascular;  Laterality: Left;    TUBAL LIGATION      VAGINAL DELIVERY      x 5     Social History     Tobacco Use    Smoking status: Never    Smokeless tobacco: Never   Substance Use Topics    Alcohol use: Never    Drug use: Yes     Types: Hydrocodone      Current Outpatient Medications   Medication Instructions    albuterol 2 mg/5 mL syrup take 1/2 (one-half) TEASPOONFUL (2.5mls) BY MOUTH THREE TIMES DAILY    albuterol-ipratropium (DUO-NEB) 2.5 mg-0.5 mg/3 mL nebulizer solution Ipratropium-Albuterol 0.5-2.5 (3) MG/3ML Inhalation Solution QTY: 120 mL Days: 30 Refills: 11  Written: 22 Patient Instructions: USE 1 VIAL IN NEBULIZER 4 TIMES DAILY    azelastine (ASTELIN) 137 mcg (0.1 %) nasal spray 1 spray, 2 times  daily    budesonide (PULMICORT) 0.5 mg/2 mL nebulizer solution Budesonide 0.5 MG/2ML Inhalation Suspension QTY: 120 mL Days: 30 Refills: 11  Written: 22 Patient Instructions: USE 1 VIAL  IN  NEBULIZER TWICE  DAILY - rinse mouth out after each use    chlorhexidine (PERIDEX) 0.12 % solution SMARTSI.5 Ounce(s) By Mouth Twice Daily    cyanocobalamin 1,000 mcg/mL injection Cyanocobalamin 1000 MCG/ML Injection Solution QTY: 1 mL Days: 0 Refills: 0  Written: 22 Patient Instructions: ONCE MONTHLY    cyclobenzaprine (FLEXERIL) 5 mg, Oral    FARXIGA 10 mg, Oral, Daily    fluticasone propionate (FLONASE) 50 mcg/actuation nasal spray Fluticasone Propionate 50 MCG/ACT Nasal Suspension QTY: 1 gram Days: 30 Refills: 3  Written: 22 Patient Instructions: twice a day 1 spray each nostril    glimepiride (AMARYL) 4 MG tablet No dose, route, or frequency recorded.    isosorbide mononitrate (IMDUR) 30 mg, Oral, Daily    levalbuterol (XOPENEX HFA) 45 mcg/actuation inhaler Xopenex HFA 45 MCG/ACT Inhalation Aerosol QTY: 0  Days: 0 Refills: 0  Written: 22 Patient Instructions:    LINZESS 145 mcg Cap capsule TAKE ONE CAPSULE BY MOUTH ONCE DAILY FOR constipation    losartan (COZAAR) 100 MG tablet Losartan Potassium 100 MG Oral Tablet QTY: 90 tablet Days: 90 Refills: 3  Written: 22 Patient Instructions: once a day    metFORMIN (GLUCOPHAGE) 500 MG tablet No dose, route, or frequency recorded.    metoprolol succinate (TOPROL-XL) 100 mg, Oral, Daily    montelukast (SINGULAIR) 10 mg, Oral, Nightly    morphine (MS CONTIN) 15 mg, Oral, Nightly    neomycin-polymyxin-dexamethasone (MAXITROL) 3.5mg/mL-10,000 unit/mL-0.1 % DrpS 1 drop into right EYE FOUR TIMES DAILY FOR 10 DAYS    olopatadine (PATANOL) 0.1 % ophthalmic solution Pataday 0.1% Ophthalmic Solution QTY: 0  Days: 0 Refills: 0  Written: 08/10/22 Patient Instructions:    oxyCODONE-acetaminophen (PERCOCET)  mg per tablet TAKE ONE TABLET BY MOUTH 2 TO 3 times  DAILY AS NEEDED FOR PAIN    pantoprazole (PROTONIX) 40 MG tablet No dose, route, or frequency recorded.    repaglinide (PRANDIN) 1 MG tablet Repaglinide 1 MG Oral Tablet QTY: 60 tablet Days: 30 Refills: 5  Written: 04/11/22 Patient Instructions: twice a day with food    RYMED, DEXCHLORPHENIRAMINE-PE, 2-10 mg Tab 1 tablet, Oral, 2 times daily    spironolactone-hydrochlorothiazide 25-25mg (ALDACTAZIDE) 25-25 mg Tab Spironolactone-HCTZ 25-25 MG Oral Tablet QTY: 0 tablet Days: 7 Refills: 0  Written: 11/10/22 Patient Instructions:    valsartan (DIOVAN) 160 MG tablet TAKE ONE TABLET BY MOUTH DAILY (STOP losartan)        EXAM:  Constitutional  General Appearance:  BMI 37.13 obese, NAD  Psychiatric   Orientation: Oriented to time, oriented to place, oriented to person  Mood and Affect: Active and alert, normal mood, normal affect  Gait and Station   Appearance:  Antalgic gait to the right uses a cane/walker for ambulation, unable to tandem gait, unable to walk on toes, unable to walk on heels    CERVICAL  Musculoskeletal System  Shoulder:  normal appearance, no instability, no tenderness, normal ROM right, normal ROM left, Pain with ROM    Cervical Spine  Inspection:  alignment normal, no muscle atrophy  Soft Tissue Palpation on the Right:  no tenderness of the paracervicals, no tenderness of the trapezius, no tenderness of the rhomboid  Soft Tissue Palpation on the Left:  no tenderness of the paracervicals, no tenderness of the trapezius, no tenderness of the rhomboid  Bony Palpation:  no tenderness of the occipital protuberance  Active Range:     Flexion decreased, Extension decreased, Rotation to the left decreased, Rotation to the right decreased, Lateral flexion to the left decreased, Lateral flexion to the right decreased   Pain elicited on motion    Motor Strength  C5 on the right:  abduction deltoid 5/5  C5 on the left:  abduction deltoid 5/5  C6 on the Right:  flexion biceps 4/5, wrist extension 4/5  C6 on the  Left:  flexion biceps 5/5, wrist extension 5/5  C7 on the Right:  extension fingers 4/5, extension triceps 4/5  C7 on the Left:  extension fingers 5/5, extension triceps 5/5  C8 on the Right:  flexion fingers 4/5  C8 on the Left:  flexion fingers 5/5  T1 on the Right:  abduction fingers 4/5  T1 on the Left:  abduction fingers 5/5    Neurological System  Sensation on the Right:  normal sensation of the extremities: right, normal median nerve distribution, normal ulnar nerve distribution  Sensation on the Left:  normal sensation of the extremities: left, normal median nerve distribution, normal ulnar nerve distribution  Biceps Reflex Right:  normal, Brachioradialis Reflex Right:  normal, Triceps Reflex Right: normal  Biceps Reflex Left:  normal, Brachioradialis Reflex Left: normal, Triceps Reflex Left:  normal  Special Test on the Right:  Spurlings test negative, Hoffmans reflex absent, no ankle clonus, Durkan test negative, Tinels sign negative at the elbow  Special Test on the Left:  Spurlings test negative, Hoffmans reflex absent, no ankle clonus, Durkan test negative, Tinels sign negative at the elbow    Skin:   Head and Neck:  normal   Right Upper Extremity:  normal   Left Upper Extremity:  normal    Cardiovascular:   Arterial Pulses Right:  radial right   Arterial Pulses Left:  radial left   Edema Right:  none   Edema Left:  none    LUMBAR  Musculoskeletal System   Hips: Normal appearance, no leg length discrepancy, normal motion; left, normal motion; right    Lumbar Spine                   Inspection:  Normal alignment, normal sagittal balance                  Range of motion:  Decreased flexion, extension, lateral bending, rotation. Pain with range of motion                  Bony Palpation of the Lumbar Spine:  No tenderness of the spinous process, no tenderness of the sacrum, no tenderness of the coccyx                  Bony Palpation of the Right Hip:  No tenderness of the iliac crest, no tenderness of the  sciatic notch, no tenderness of the SI joint                  Bony Palpation of the Left Hip:  No tenderness of the iliac crest, no tenderness of the sciatic notch, no tenderness of the SI joint                  Soft Tissue Palpation on the Right:  No tenderness of the paraspinal region, no tenderness of the iliolumbar region                  Soft Tissue Palpation on the Left:  No tenderness of the paraspinal region, no tenderness of the iliolumbar region    Motor Strength   L1 Right:  Hip flexion iliopsoas 4/5    L1 Left:  Hip flexion iliopsoas 4/5              L2-L4 Right:  Knee extension quadriceps 4/5, tibialis anterior 4/5              L2-L4 Left:  Knee extension quadriceps 5/5, tibialis anterior 5/5   L5 Right:  Extensor hallucis llongus 4/5,    L5 Left:  Extensor hallucis longus 5/5,    S1 Right:  Plantar flexion gastrocnemius 4/5   S1 Left:  Plantar flexion gastrocnemius 5/5    Neurological System   Ankle Reflex Right:  normal   Ankle Reflex Left: normal   Knee Reflex Right:  normal   Knee Reflex Left:  normal   Sensation on the Right:  L2 normal, L3 normal, L4 normal, L5 normal, S1 normal   Sensation on the Left:  L2 normal, L3 normal, L4 normal, L5 normal, S1 normal              Special Test on the Right:  Seated straight leg raising test negative, no clonus of the ankle              Special Test on the Left:  Seated straight leg raising test negative, no clonus of the ankle    Skin   Lumbosacral Spine:  Normal skin    Cardiovascular System   Arterial Pulses Right:  Posterior tibialis normal, dorsalis pedis normal   Arterial Pulses Left:  Posterior tibialis normal, dorsalis pedis normal   Edema Right: None   Edema Left:  None

## 2024-04-08 ENCOUNTER — TELEPHONE (OUTPATIENT)
Dept: CARDIOLOGY | Facility: CLINIC | Age: 79
End: 2024-04-08
Payer: MEDICARE

## 2024-05-14 ENCOUNTER — OFFICE VISIT (OUTPATIENT)
Dept: CARDIOLOGY | Facility: CLINIC | Age: 79
End: 2024-05-14
Payer: MEDICARE

## 2024-05-14 VITALS
HEART RATE: 67 BPM | RESPIRATION RATE: 18 BRPM | WEIGHT: 212 LBS | SYSTOLIC BLOOD PRESSURE: 155 MMHG | BODY MASS INDEX: 39.01 KG/M2 | HEIGHT: 62 IN | DIASTOLIC BLOOD PRESSURE: 80 MMHG

## 2024-05-14 DIAGNOSIS — I87.2 VENOUS INSUFFICIENCY: ICD-10-CM

## 2024-05-14 DIAGNOSIS — I10 PRIMARY HYPERTENSION: ICD-10-CM

## 2024-05-14 DIAGNOSIS — I10 PRIMARY HYPERTENSION: Primary | ICD-10-CM

## 2024-05-14 DIAGNOSIS — I50.33 ACUTE ON CHRONIC HEART FAILURE WITH PRESERVED EJECTION FRACTION: Primary | ICD-10-CM

## 2024-05-14 PROCEDURE — 99213 OFFICE O/P EST LOW 20 MIN: CPT | Mod: S$PBB,,, | Performed by: STUDENT IN AN ORGANIZED HEALTH CARE EDUCATION/TRAINING PROGRAM

## 2024-05-14 PROCEDURE — 3079F DIAST BP 80-89 MM HG: CPT | Mod: CPTII,,, | Performed by: STUDENT IN AN ORGANIZED HEALTH CARE EDUCATION/TRAINING PROGRAM

## 2024-05-14 PROCEDURE — 93010 ELECTROCARDIOGRAM REPORT: CPT | Mod: S$PBB,,, | Performed by: STUDENT IN AN ORGANIZED HEALTH CARE EDUCATION/TRAINING PROGRAM

## 2024-05-14 PROCEDURE — 3077F SYST BP >= 140 MM HG: CPT | Mod: CPTII,,, | Performed by: STUDENT IN AN ORGANIZED HEALTH CARE EDUCATION/TRAINING PROGRAM

## 2024-05-14 PROCEDURE — 1159F MED LIST DOCD IN RCRD: CPT | Mod: CPTII,,, | Performed by: STUDENT IN AN ORGANIZED HEALTH CARE EDUCATION/TRAINING PROGRAM

## 2024-05-14 PROCEDURE — 93005 ELECTROCARDIOGRAM TRACING: CPT | Mod: PBBFAC | Performed by: STUDENT IN AN ORGANIZED HEALTH CARE EDUCATION/TRAINING PROGRAM

## 2024-05-14 PROCEDURE — 99214 OFFICE O/P EST MOD 30 MIN: CPT | Mod: PBBFAC,25 | Performed by: STUDENT IN AN ORGANIZED HEALTH CARE EDUCATION/TRAINING PROGRAM

## 2024-05-14 RX ORDER — DAPAGLIFLOZIN 10 MG/1
10 TABLET, FILM COATED ORAL DAILY
Qty: 90 TABLET | Refills: 3 | Status: SHIPPED | OUTPATIENT
Start: 2024-05-14 | End: 2024-06-12 | Stop reason: SDUPTHER

## 2024-05-14 RX ORDER — ROSUVASTATIN CALCIUM 5 MG/1
5 TABLET, COATED ORAL DAILY
COMMUNITY

## 2024-05-14 RX ORDER — METOPROLOL SUCCINATE 200 MG/1
200 TABLET, EXTENDED RELEASE ORAL DAILY
Qty: 90 TABLET | Refills: 3 | Status: SHIPPED | OUTPATIENT
Start: 2024-05-14 | End: 2025-05-14

## 2024-05-14 NOTE — PROGRESS NOTES
"  PCP: Samuel Pelayo DO    Referring Provider:     Subjective:   Quiana Bland is a 78 y.o. female with hx of HTN, DM, HLD who presents to establish cardiac care.     5/14/24 - Doing well. Hypertensive today. PCP adjusting medication. I will increase metoprolol today    2/16/23 - Poor historian. Report chronic leg swelling with knee pain. Has orthopnea and PND. Limited mobility. Wearing stockings today    2/2022 - Patient reports chronic aches and pain. Has chronic chest pain. Last [facility] in 2019 with mild non-obstructive CAD. As per the patient she has nitro patches that help her and is adamant that she would like to continue them.     Fhx: non-contributary  Shx: Denies smoking, etoh or drug use    EKG 2/14/22 - NSR, non-sp T wave changes  ECHO 2019 - normal LV and RV size and fxn. Mild LAE enlargement. No significant valvular abnl.  [facility] 2019 - mild non-obstructive CAD      Lab Results   Component Value Date     02/16/2023    K 4.2 02/16/2023     02/16/2023    CO2 28 02/16/2023    BUN 17 02/16/2023    CREATININE 1.00 02/16/2023    CALCIUM 10.0 02/16/2023    ANIONGAP 10 02/16/2023       No results found for: "CHOL"  No results found for: "HDL"  No results found for: "LDLCALC"  No results found for: "TRIG"  No results found for: "CHOLHDL"    Lab Results   Component Value Date    WBC 15.42 (H) 02/16/2023    HGB 12.1 02/16/2023    HCT 39.2 02/16/2023    MCV 93.6 02/16/2023     (H) 02/16/2023           Review of Systems   Respiratory:  Negative for cough and shortness of breath.    Cardiovascular:  Negative for chest pain, palpitations, orthopnea, claudication, leg swelling and PND.         Objective:   BP (!) 155/80   Pulse 67   Resp 18   Ht 5' 2" (1.575 m)   Wt 96.2 kg (212 lb)   BMI 38.78 kg/m²     Physical Exam  Constitutional:       Appearance: She is obese.   Cardiovascular:      Rate and Rhythm: Normal rate and regular rhythm.      Pulses: Normal pulses.      Heart sounds: Normal heart " sounds.   Pulmonary:      Effort: Pulmonary effort is normal.      Breath sounds: Normal breath sounds.   Neurological:      Mental Status: She is alert.           Assessment:     1. Acute on chronic heart failure with preserved ejection fraction        2. Venous insufficiency        3. Primary hypertension                Plan:   Acute on chronic heart failure with preserved ejection fraction  Symptoms congestive HF  NTproBNP normal  Leg swelling due to venous insuffieciency        Venous insufficiency  New diagnosis  Follows Dr. Babin    HTN (hypertension)  Uncontrolled  Increase metoprolol to 200mg qd

## 2024-05-17 LAB
OHS QRS DURATION: 92 MS
OHS QTC CALCULATION: 409 MS

## 2024-05-31 NOTE — TELEPHONE ENCOUNTER
----- Message from Shae Reyes sent at 4/8/2024 11:42 AM CDT -----  Regarding: Medication Refill  Patient requested a medication refill on     metoprolol succinate (TOPROL-XL) 100 MG 24 hr tablet  dapagliflozin (FARXIGA) 10 mg tablet    Preferred Pharmacy    [facility] - [place], [place] - [address] [place] [address]         Unable to assess

## 2024-06-12 RX ORDER — DAPAGLIFLOZIN 10 MG/1
10 TABLET, FILM COATED ORAL DAILY
Qty: 90 TABLET | Refills: 3 | Status: SHIPPED | OUTPATIENT
Start: 2024-06-12

## 2025-02-24 ENCOUNTER — OFFICE VISIT (OUTPATIENT)
Dept: VASCULAR SURGERY | Facility: CLINIC | Age: 80
End: 2025-02-24
Payer: MEDICARE

## 2025-02-24 VITALS
WEIGHT: 209.38 LBS | DIASTOLIC BLOOD PRESSURE: 68 MMHG | HEIGHT: 62 IN | SYSTOLIC BLOOD PRESSURE: 184 MMHG | HEART RATE: 71 BPM | RESPIRATION RATE: 16 BRPM | BODY MASS INDEX: 38.53 KG/M2

## 2025-02-24 DIAGNOSIS — R60.0 EDEMA, LOWER EXTREMITY: ICD-10-CM

## 2025-02-24 DIAGNOSIS — I87.2 VENOUS INSUFFICIENCY: ICD-10-CM

## 2025-02-24 DIAGNOSIS — L81.9 HYPERPIGMENTATION OF SKIN: Primary | ICD-10-CM

## 2025-02-24 PROCEDURE — 1159F MED LIST DOCD IN RCRD: CPT | Mod: CPTII,,, | Performed by: FAMILY MEDICINE

## 2025-02-24 PROCEDURE — 99215 OFFICE O/P EST HI 40 MIN: CPT | Mod: PBBFAC | Performed by: FAMILY MEDICINE

## 2025-02-24 PROCEDURE — 99999 PR PBB SHADOW E&M-EST. PATIENT-LVL V: CPT | Mod: PBBFAC,,, | Performed by: FAMILY MEDICINE

## 2025-02-24 PROCEDURE — 3077F SYST BP >= 140 MM HG: CPT | Mod: CPTII,,, | Performed by: FAMILY MEDICINE

## 2025-02-24 PROCEDURE — 3078F DIAST BP <80 MM HG: CPT | Mod: CPTII,,, | Performed by: FAMILY MEDICINE

## 2025-02-24 PROCEDURE — 1160F RVW MEDS BY RX/DR IN RCRD: CPT | Mod: CPTII,,, | Performed by: FAMILY MEDICINE

## 2025-02-24 PROCEDURE — 99213 OFFICE O/P EST LOW 20 MIN: CPT | Mod: S$PBB,,, | Performed by: FAMILY MEDICINE

## 2025-02-24 NOTE — PROGRESS NOTES
VEIN CENTER CLINIC NOTE    Patient ID: Quiana Bland is a 79 y.o. female.    I. HISTORY     Chief Complaint:   Chief Complaint   Patient presents with    Follow-up     1Y PA         HPI: Quiana Bland is a 79 y.o. female who presents today for a 12-month follow-up after undergoing bilateral great saphenous vein radiofrequency ablation.  She states that she has noticed less swelling and tightness in her lower extremities.  She continues to wear her 15-20 millimeter of mercury compression stockings daily.  She also continues to follow with Dr. Meza for her back pain and sciatic nerve pain.  She continues to complain of right knee pain and states that she needs to get back to see Dr. Juan M Curtis, she has had previous injections.  She also states she went to the ER about a week ago after smashing her left thumb and sutures and a drain were placed.  These have not been taken out as of yet and she has no follow-up.  We will see that she gets to general surgery.    Clinical Summary:  Patient initially seen on 02/28/2023 by referral from Dr Ramírez, cardiology, for evaluation of bilateral lower extremity leg swelling and discomfort.  Symptoms are progressive/worsening and began a proximally 2 years ago.  Location is bilateral lower extremities below the knees. Symptoms are worse at the end of the day.  History of venous interventions includes none.  Unknown family history of venous disease.  Patient also sees Dr. Lozoya, pulmonology, and has a history of obstructive sleep apnea and uses a CPAP nightly.  She also sleeps in a chair secondary to orthopnea.  Also complains of right knee pain and swelling and limited ambulation throughout the day.  Recent negative BNP.  Echocardiogram pending.    Bilateral complete venous reflux study performed 11/06/2023 shows no evidence of DVT bilaterally.  The study also shows well ablated bilateral great saphenous veins and their entirety.  No other superficial venous reflux  noted.    Venous Disease Medical Necessity Documentation Initial Visit Date:  02/28/2023 Return Check Date: 06/08/2023   Have you ever had a rupture or bleed from a varicose vein in your leg(s)?              [] Yes  [x] No   [] Yes   [x] No   Have you ever been diagnosed with phlebitis, cellulitis, or inflammation in the area of the varicose veins of  your leg(s)?  [] Yes  [x] No    [] Yes   [x] No   Do you have darkened or inflamed skin on your legs?   [] Yes   [x] No   [] Yes   [x] No   Do you have leg swelling?     [x] Yes   [] No   [x] Yes   [] No   Do you have leg pain?   [x] Yes   [] No   [x] Yes   [] No   If yes, describe the type of pain?    [x]   Stabbing [x]  Radiating [x]  Aching   [x]  Tightness [x]  Throbbing               [x]  Burning [x]  Cramping          No change    Do you have leg discomfort?   [x] Yes   [] No   [x] Yes   [] No   If yes, describe the type of discomfort?    [x]  Heaviness [x]  Fullness   [x]  Restlessness [x] Tired/Fatigued [x] Itching          No change except now no itching    Have you ever worn compression hose?   [x] Yes   [] No   [x] Yes   [] No   If yes, how long?    18 months    3 months   Do you elevate your legs while sitting?   [x] Yes   [] No   [x] Yes   [] No   Does venous disease (varicose veins, ulcers, skin changes, leg pain/swelling) interfere with your daily life?  If yes, check activities you are limited or unable to do.    [x]  Shower  [x]   Walk  []  Exercise  [x] Play with children/grandchildren  [x]  Shop [] Work [x] Stand for any period of time [x] Sleep                               [x] Sitting for an extended period of time.           [x] Yes   [] No   [x] Yes   [] No   No change   Do you exercise/have you tried to exercise (i.e.  Walk our participate in a regular exercise routine)?  [] Yes  [x] No   [] Yes   [x] No   BMI 41.8   38.5          Past Medical History:   Diagnosis Date    CHF (congestive heart failure)     DM (diabetes mellitus)     High  cholesterol     HTN (hypertension)     Seizures         Past Surgical History:   Procedure Laterality Date    CARDIAC CATHETERIZATION       SECTION      x 1    gall stones removed      HYSTERECTOMY      RADIOFREQUENCY ABLATION Right 2023    Procedure: Right GSV RF Ablation;  Surgeon: Brandan Babin DO;  Location: [facility] OR;  Service: Peripheral Vascular;  Laterality: Right;    RADIOFREQUENCY ABLATION Left 2023    Procedure: Left RF Ablation;  Surgeon: Brandan Babin DO;  Location: [facility] OR;  Service: Peripheral Vascular;  Laterality: Left;    TUBAL LIGATION      VAGINAL DELIVERY      x 5       Social History     Tobacco Use   Smoking Status Never   Smokeless Tobacco Never         Current Outpatient Medications:     glimepiride (AMARYL) 4 MG tablet, , Disp: , Rfl:     LINZESS 145 mcg Cap capsule, TAKE ONE CAPSULE BY MOUTH ONCE DAILY FOR constipation, Disp: , Rfl:     metFORMIN (GLUCOPHAGE) 500 MG tablet, , Disp: , Rfl:     metoprolol succinate (TOPROL-XL) 200 MG 24 hr tablet, Take 1 tablet (200 mg total) by mouth once daily., Disp: 90 tablet, Rfl: 3    montelukast (SINGULAIR) 10 mg tablet, Take 10 mg by mouth every evening., Disp: , Rfl:     morphine (MS CONTIN) 15 MG 12 hr tablet, Take 15 mg by mouth nightly., Disp: , Rfl:     oxyCODONE-acetaminophen (PERCOCET)  mg per tablet, TAKE ONE TABLET BY MOUTH 2 TO 3 times DAILY AS NEEDED FOR PAIN, Disp: , Rfl:     pantoprazole (PROTONIX) 40 MG tablet, , Disp: , Rfl:     valsartan (DIOVAN) 160 MG tablet, TAKE ONE TABLET BY MOUTH DAILY (STOP losartan), Disp: , Rfl:     albuterol 2 mg/5 mL syrup, take 1/2 (one-half) TEASPOONFUL (2.5mls) BY MOUTH THREE TIMES DAILY (Patient not taking: Reported on 2025), Disp: , Rfl:     albuterol-ipratropium (DUO-NEB) 2.5 mg-0.5 mg/3 mL nebulizer solution, Ipratropium-Albuterol 0.5-2.5 (3) MG/3ML Inhalation Solution QTY: 120 mL Days: 30 Refills: 11  Written: 22 Patient Instructions: USE 1 VIAL IN  NEBULIZER 4 TIMES DAILY (Patient not taking: Reported on 2/24/2025), Disp: , Rfl:     azelastine (ASTELIN) 137 mcg (0.1 %) nasal spray, 1 spray 2 (two) times daily. (Patient not taking: Reported on 2/24/2025), Disp: , Rfl:     budesonide (PULMICORT) 0.5 mg/2 mL nebulizer solution, , Disp: , Rfl:     chlorhexidine (PERIDEX) 0.12 % solution, , Disp: , Rfl:     cyanocobalamin 1,000 mcg/mL injection, Cyanocobalamin 1000 MCG/ML Injection Solution QTY: 1 mL Days: 0 Refills: 0  Written: 04/13/22 Patient Instructions: ONCE MONTHLY (Patient not taking: Reported on 2/24/2025), Disp: , Rfl:     dapagliflozin propanediol (FARXIGA) 10 mg tablet, Take 1 tablet (10 mg total) by mouth once daily. (Patient not taking: Reported on 2/24/2025), Disp: 90 tablet, Rfl: 3    isosorbide mononitrate (IMDUR) 30 MG 24 hr tablet, Take 1 tablet (30 mg total) by mouth once daily., Disp: 90 tablet, Rfl: 3    rosuvastatin (CRESTOR) 5 MG tablet, Take 5 mg by mouth once daily. (Patient not taking: Reported on 2/24/2025), Disp: , Rfl:   No current facility-administered medications for this visit.    Facility-Administered Medications Ordered in Other Visits:     LIDOcaine-EPINEPHrine 1%-1:100,000 30 mL, LIDOcaine HCL 10 mg/ml (1%) 20 mL, sodium bicarbonate 10 mL in sodium chloride 0.9% 500 mL solution, , MISCELLANEOUS, Once, Brandan Babin, DO    Review of Systems   Constitutional:  Negative for activity change, chills, diaphoresis, fatigue and fever.   Respiratory:  Negative for cough and shortness of breath.    Cardiovascular:  Positive for leg swelling. Negative for chest pain and claudication.        Hyperpigmentation LE   Gastrointestinal:  Negative for nausea and vomiting.   Musculoskeletal:  Positive for leg pain. Negative for joint swelling.   Integumentary:  Negative for rash and wound.   Neurological:  Negative for weakness and numbness.          II. PHYSICAL EXAM     Physical Exam  Constitutional:       General: She is awake. She is not in  acute distress.     Appearance: Normal appearance. She is obese. She is not ill-appearing or toxic-appearing.   HENT:      Head: Normocephalic and atraumatic.   Eyes:      Extraocular Movements: Extraocular movements intact.      Conjunctiva/sclera: Conjunctivae normal.      Pupils: Pupils are equal, round, and reactive to light.   Neck:      Vascular: No carotid bruit or JVD.   Cardiovascular:      Rate and Rhythm: Normal rate and regular rhythm.      Pulses:           Dorsalis pedis pulses are detected w/ Doppler on the right side and detected w/ Doppler on the left side.        Posterior tibial pulses are detected w/ Doppler on the right side and detected w/ Doppler on the left side.      Heart sounds: No murmur heard.  Pulmonary:      Effort: Pulmonary effort is normal. No respiratory distress.      Breath sounds: No stridor. No wheezing, rhonchi or rales.   Musculoskeletal:         General: No swelling, tenderness or deformity.      Right lower le+ Edema present.      Left lower le+ Edema present.      Comments: No evidence of cellulitis, weeping or open ulceration bilaterally.   Feet:      Comments: Triphasic hand-held dopplerable pulses of the bilateral dorsalis pedis and posterior tibial arteries.  Skin:     General: Skin is warm.      Capillary Refill: Capillary refill takes less than 2 seconds.      Coloration: Skin is not ashen.      Findings: No bruising, erythema, lesion, rash or wound.   Neurological:      Mental Status: She is alert and oriented to person, place, and time.      Motor: No weakness.   Psychiatric:         Speech: Speech normal.         Behavior: Behavior normal. Behavior is cooperative.         Reticular/Spider veins noted:  RLE: none  LLE: none    Varicose veins noted:  RLE: none  LLE:  none    CEAP Classification  Clinical Signs: Class 4 - Skin changes ascribed to venous disease  Etiologic Classification: Primary  Anatomic distribution: Superficial  Pathophysiologic  dysfunction: Reflux       Venous Clinical Severity Score  Pain:2=Daily, moderate activity limitation, occasional analgesics  Varicose Veins: 1=Few, scattered. Branch varicose veins  Venous Edema: 2=Afternoon edema, above ankle  Pigmentation: 1=Diffuse, but limited in area and old (brown)  Inflammation: 0=None  Induration: 0=None  Number of Active Ulcers: 0=0  Active Ulceration, Duration: 0=None  Active Ulcer Size: 0=None  Compressive Therapy: 3=Full compliance, stockings + elevation  Total Score: 9       III. ASSESSMENT & PLAN (MEDICAL DECISION MAKING)     1. Hyperpigmentation of skin    2. Edema, lower extremity    3. Venous insufficiency        Assessment/Diagnosis and Plan:  Appears to be doing okay from a venous standpoint.  Continue with daily 15-20 millimeter of mercury compression stockings, calf exercises and therapeutic leg elevation.      Continue to follow up with Dr. Meza, Pain, and Dr. Juan M Curtis, orthopedics.      Refer to general surgery for left thumb injury with drain and sutures.    Follow up in twelve months.  No test unless she is having trouble.       Brandan Babin, DO

## 2025-04-30 RX ORDER — DAPAGLIFLOZIN 10 MG/1
10 TABLET, FILM COATED ORAL
Qty: 90 TABLET | Refills: 3 | Status: SHIPPED | OUTPATIENT
Start: 2025-04-30

## 2025-05-19 ENCOUNTER — PATIENT OUTREACH (OUTPATIENT)
Facility: HOSPITAL | Age: 80
End: 2025-05-19
Payer: MEDICARE

## 2025-05-19 NOTE — PROGRESS NOTES
Population Health Chart Review & Patient Outreach Details        Health Maintenance Topics Addressed and Outreach Outcomes / Actions Taken:  pcp [x] Telephone outreach to notify patient to call [employer] and update pcp. No answer,. No vm.

## (undated) DEVICE — BNDG COFLEX FOAM LF2 ST 4X5YD

## (undated) DEVICE — Device

## (undated) DEVICE — GLOVE PROTEXIS PI SYN SURG 7.5

## (undated) DEVICE — COVER SNAP KAP 26IN

## (undated) DEVICE — SUT ETHILON 3-0 PS2 18 BLK

## (undated) DEVICE — TUBING FOR TUMESCENT INFILTRATION PUMP

## (undated) DEVICE — GOWN POLY REINF BRTH SLV XL

## (undated) DEVICE — KIT ACCESS PROCEDURE PACK 7CM

## (undated) DEVICE — GLOVE PROTEXIS PI SYN SURG 6.5

## (undated) DEVICE — GLOVE 6.5 PROTEXIS PI BLUE

## (undated) DEVICE — APPLICATOR CHLORAPREP ORN 26ML

## (undated) DEVICE — BNDG COFLEX FOAM LF2 ST 6X5YD

## (undated) DEVICE — BANDAGE GAUZE COT STRL 4.5X4.1

## (undated) DEVICE — SPONGE COTTON TRAY 4X4IN

## (undated) DEVICE — SET TUMESCENT TUBING PUMP 4M